# Patient Record
Sex: FEMALE | Race: OTHER | NOT HISPANIC OR LATINO
[De-identification: names, ages, dates, MRNs, and addresses within clinical notes are randomized per-mention and may not be internally consistent; named-entity substitution may affect disease eponyms.]

---

## 2017-08-18 ENCOUNTER — RESULT REVIEW (OUTPATIENT)
Age: 27
End: 2017-08-18

## 2018-05-18 ENCOUNTER — EMERGENCY (EMERGENCY)
Facility: HOSPITAL | Age: 28
LOS: 1 days | Discharge: HOME | End: 2018-05-18
Attending: EMERGENCY MEDICINE
Payer: SELF-PAY

## 2018-05-18 VITALS
SYSTOLIC BLOOD PRESSURE: 135 MMHG | HEART RATE: 60 BPM | DIASTOLIC BLOOD PRESSURE: 78 MMHG | WEIGHT: 175.05 LBS | RESPIRATION RATE: 19 BRPM | HEIGHT: 65 IN | OXYGEN SATURATION: 96 % | TEMPERATURE: 98 F

## 2018-05-18 PROCEDURE — 99283 EMERGENCY DEPT VISIT LOW MDM: CPT | Mod: 25

## 2018-05-18 PROCEDURE — 99053 MED SERV 10PM-8AM 24 HR FAC: CPT

## 2018-05-18 NOTE — ED ADULT NURSE NOTE - OBJECTIVE STATEMENT
Assumed pt care @ 4845. Pt received sitting on stretcher in NAD. Pt AOx3 C/O getting hot oil splashed from a hot pan onto left wrist/forearm. Pt reports its starting to get swollen/red and warm to touch and ithcing. Pt has put bacitracin. Pt had large blistered that peeled and now is scabbing. No pus noted and warm to touch. Scattered small healed burns throughout b/l arms.

## 2018-05-18 NOTE — ED ADULT NURSE NOTE - ADDITIONAL PRINTED INSTRUCTIONS GIVEN
pt d/c in stable condition, no apparent distress noted at this time. pt A&Ox3. pt able to ambulate with steady gait. Pt aware to pickup meds @ pharm

## 2018-05-19 VITALS
HEART RATE: 55 BPM | RESPIRATION RATE: 18 BRPM | SYSTOLIC BLOOD PRESSURE: 118 MMHG | DIASTOLIC BLOOD PRESSURE: 81 MMHG | OXYGEN SATURATION: 99 % | TEMPERATURE: 98 F

## 2018-05-19 PROCEDURE — 99283 EMERGENCY DEPT VISIT LOW MDM: CPT

## 2018-05-19 RX ADMIN — Medication 1 TABLET(S): at 03:09

## 2018-05-19 NOTE — ED PROVIDER NOTE - OBJECTIVE STATEMENT
28 y/o female presents to the ED with c/o burn to the left forearm, onset 1 week ago. Pt states she sustained burn from oil while at work. Pt reports itchiness to area, states she scratched region. Pt denies numbness, tingling, fever, and any other acute symptoms and complaints at this time. Tetanus UTD. 28 y/o female presents to the ED with c/o burn to the left forearm, onset 1 week ago. Pt states she sustained burn from oil while at work. Pt reports itchiness to area, states she scratched region. Pt states she has been picking at wound. Pt denies numbness, tingling, fever, and any other acute symptoms and complaints at this time. Tetanus UTD.

## 2018-05-19 NOTE — ED PROVIDER NOTE - PHYSICAL EXAMINATION
Constitutional : Appears comfortably, talking in full sentences  Head :NC AT , no swelling  Eyes :eomi, no swelling  Mouth :mm moist,  Neck : supple, trachea in midline  Chest :Rei air entry, symm chest expansion, no distress  Heart :S1 S2 distant  Abdomen :abd soft, non tender  Musc/Skel : ventral aspect 2x1in with granulating tissue, no puss pocket, minimal erythema extending proximal to wound on forearm, extending area is tender, warm, and mildly erythematous. ext no swelling, no deformity, no spine tenderness, distal pulses present  Neuro  :AAO 3 no focal deficits

## 2018-05-19 NOTE — ED PROVIDER NOTE - NS ED ROS FT
no weight change, no fever or chills  no rash, no bruises  no visual changes no eye discharge  no cough cold or congestion,   no sob, no chest pain  no orthopnea, no pnd  no abd pain, no n/v/d  no hematuria, no change in urinary habits  +burn to skin, no joint pain, no deformity  no headache, no paresthesia

## 2018-08-08 ENCOUNTER — EMERGENCY (EMERGENCY)
Facility: HOSPITAL | Age: 28
LOS: 1 days | Discharge: HOME | End: 2018-08-08
Attending: STUDENT IN AN ORGANIZED HEALTH CARE EDUCATION/TRAINING PROGRAM
Payer: COMMERCIAL

## 2018-08-08 VITALS
SYSTOLIC BLOOD PRESSURE: 123 MMHG | TEMPERATURE: 100 F | DIASTOLIC BLOOD PRESSURE: 80 MMHG | OXYGEN SATURATION: 98 % | RESPIRATION RATE: 17 BRPM | HEART RATE: 77 BPM

## 2018-08-08 VITALS — HEIGHT: 66 IN | WEIGHT: 169.09 LBS

## 2018-08-08 LAB — HCG UR QL: NEGATIVE — SIGNIFICANT CHANGE UP

## 2018-08-08 PROCEDURE — 99283 EMERGENCY DEPT VISIT LOW MDM: CPT

## 2018-08-08 PROCEDURE — 81025 URINE PREGNANCY TEST: CPT

## 2018-08-08 RX ORDER — IBUPROFEN 200 MG
600 TABLET ORAL ONCE
Qty: 0 | Refills: 0 | Status: COMPLETED | OUTPATIENT
Start: 2018-08-08 | End: 2018-08-08

## 2018-08-08 RX ORDER — ACETAMINOPHEN 500 MG
650 TABLET ORAL ONCE
Qty: 0 | Refills: 0 | Status: COMPLETED | OUTPATIENT
Start: 2018-08-08 | End: 2018-08-08

## 2018-08-08 RX ADMIN — Medication 650 MILLIGRAM(S): at 17:01

## 2018-08-08 RX ADMIN — Medication 600 MILLIGRAM(S): at 17:01

## 2018-08-08 NOTE — ED PROVIDER NOTE - ENMT, MLM
Airway patent, Nasal mucosa clear. Mouth with normal mucosa. Throat has no vesicles, no oropharyngeal exudates and uvula is midline. TM good light reflex b/l no evidence of infection

## 2018-08-08 NOTE — ED PROVIDER NOTE - MEDICAL DECISION MAKING DETAILS
28yo female with no pmh presents with left sided  ha for 1 day - likely cluster ha- will give O2 NRB for 20min and meds, r/o preg - ucg

## 2018-08-08 NOTE — ED PROVIDER NOTE - OBJECTIVE STATEMENT
26yo female with no pmh presents with left sided  ha for 1 day. Pt states a pressure like ha on the left side around her eye with some ear pressure, left. Pt denies fevers/chills, loc, focal neuro deficits, cp/sob/palp, cough, abd pain/n/v/d, urinary symptoms, recent travel and sick contacts.

## 2018-08-20 ENCOUNTER — RESULT REVIEW (OUTPATIENT)
Age: 28
End: 2018-08-20

## 2018-09-06 ENCOUNTER — OUTPATIENT (OUTPATIENT)
Dept: OUTPATIENT SERVICES | Facility: HOSPITAL | Age: 28
LOS: 1 days | End: 2018-09-06
Payer: COMMERCIAL

## 2018-09-06 VITALS
TEMPERATURE: 98 F | RESPIRATION RATE: 16 BRPM | WEIGHT: 176.37 LBS | SYSTOLIC BLOOD PRESSURE: 107 MMHG | DIASTOLIC BLOOD PRESSURE: 74 MMHG | HEART RATE: 60 BPM | HEIGHT: 66 IN

## 2018-09-06 DIAGNOSIS — Z01.818 ENCOUNTER FOR OTHER PREPROCEDURAL EXAMINATION: ICD-10-CM

## 2018-09-06 DIAGNOSIS — T83.9XXA UNSPECIFIED COMPLICATION OF GENITOURINARY PROSTHETIC DEVICE, IMPLANT AND GRAFT, INITIAL ENCOUNTER: ICD-10-CM

## 2018-09-06 LAB
BASOPHILS # BLD AUTO: 0 K/UL — SIGNIFICANT CHANGE UP (ref 0–0.2)
BASOPHILS NFR BLD AUTO: 0.3 % — SIGNIFICANT CHANGE UP (ref 0–2)
EOSINOPHIL # BLD AUTO: 0.2 K/UL — SIGNIFICANT CHANGE UP (ref 0–0.5)
EOSINOPHIL NFR BLD AUTO: 2.1 % — SIGNIFICANT CHANGE UP (ref 0–6)
HCG UR QL: NEGATIVE — SIGNIFICANT CHANGE UP
HCT VFR BLD CALC: 38.8 % — SIGNIFICANT CHANGE UP (ref 37–47)
HGB BLD-MCNC: 12.7 G/DL — SIGNIFICANT CHANGE UP (ref 12–16)
LYMPHOCYTES # BLD AUTO: 2.3 K/UL — SIGNIFICANT CHANGE UP (ref 1–4.8)
LYMPHOCYTES # BLD AUTO: 24 % — SIGNIFICANT CHANGE UP (ref 20–55)
MCHC RBC-ENTMCNC: 27.1 PG — SIGNIFICANT CHANGE UP (ref 27–31)
MCHC RBC-ENTMCNC: 32.7 G/DL — SIGNIFICANT CHANGE UP (ref 32–36)
MCV RBC AUTO: 82.9 FL — SIGNIFICANT CHANGE UP (ref 81–99)
MONOCYTES # BLD AUTO: 0.8 K/UL — SIGNIFICANT CHANGE UP (ref 0–0.8)
MONOCYTES NFR BLD AUTO: 8.1 % — SIGNIFICANT CHANGE UP (ref 3–10)
NEUTROPHILS # BLD AUTO: 6.1 K/UL — SIGNIFICANT CHANGE UP (ref 1.8–8)
NEUTROPHILS NFR BLD AUTO: 65 % — SIGNIFICANT CHANGE UP (ref 37–73)
PLATELET # BLD AUTO: 223 K/UL — SIGNIFICANT CHANGE UP (ref 150–400)
RBC # BLD: 4.68 M/UL — SIGNIFICANT CHANGE UP (ref 4.4–5.2)
RBC # FLD: 14.4 % — SIGNIFICANT CHANGE UP (ref 11–15.6)
WBC # BLD: 9.4 K/UL — SIGNIFICANT CHANGE UP (ref 4.8–10.8)
WBC # FLD AUTO: 9.4 K/UL — SIGNIFICANT CHANGE UP (ref 4.8–10.8)

## 2018-09-06 PROCEDURE — 36415 COLL VENOUS BLD VENIPUNCTURE: CPT

## 2018-09-06 PROCEDURE — G0463: CPT

## 2018-09-06 PROCEDURE — 81025 URINE PREGNANCY TEST: CPT

## 2018-09-06 PROCEDURE — 85027 COMPLETE CBC AUTOMATED: CPT

## 2018-09-06 RX ORDER — SODIUM CHLORIDE 9 MG/ML
3 INJECTION INTRAMUSCULAR; INTRAVENOUS; SUBCUTANEOUS ONCE
Qty: 0 | Refills: 0 | Status: DISCONTINUED | OUTPATIENT
Start: 2018-09-10 | End: 2018-09-25

## 2018-09-06 NOTE — H&P PST ADULT - NSANTHOSAYNRD_GEN_A_CORE
No. WINSTON screening performed.  STOP BANG Legend: 0-2 = LOW Risk; 3-4 = INTERMEDIATE Risk; 5-8 = HIGH Risk

## 2018-09-06 NOTE — H&P PST ADULT - FAMILY HISTORY
Father  Still living? Unknown  Family history of diabetes mellitus, Age at diagnosis: Age Unknown     Grandparent  Still living? Unknown  Family history of diabetes mellitus, Age at diagnosis: Age Unknown

## 2018-09-09 ENCOUNTER — TRANSCRIPTION ENCOUNTER (OUTPATIENT)
Age: 28
End: 2018-09-09

## 2018-09-10 ENCOUNTER — RESULT REVIEW (OUTPATIENT)
Age: 28
End: 2018-09-10

## 2018-09-10 ENCOUNTER — OUTPATIENT (OUTPATIENT)
Dept: OUTPATIENT SERVICES | Facility: HOSPITAL | Age: 28
LOS: 1 days | End: 2018-09-10
Payer: COMMERCIAL

## 2018-09-10 VITALS
HEART RATE: 75 BPM | TEMPERATURE: 98 F | OXYGEN SATURATION: 100 % | WEIGHT: 177.91 LBS | SYSTOLIC BLOOD PRESSURE: 117 MMHG | HEIGHT: 66 IN | DIASTOLIC BLOOD PRESSURE: 54 MMHG | RESPIRATION RATE: 16 BRPM

## 2018-09-10 VITALS
RESPIRATION RATE: 16 BRPM | OXYGEN SATURATION: 100 % | SYSTOLIC BLOOD PRESSURE: 115 MMHG | HEART RATE: 56 BPM | DIASTOLIC BLOOD PRESSURE: 69 MMHG

## 2018-09-10 DIAGNOSIS — T83.32XA DISPLACEMENT OF INTRAUTERINE CONTRACEPTIVE DEVICE, INITIAL ENCOUNTER: ICD-10-CM

## 2018-09-10 PROCEDURE — 88300 SURGICAL PATH GROSS: CPT

## 2018-09-10 PROCEDURE — 88305 TISSUE EXAM BY PATHOLOGIST: CPT

## 2018-09-10 PROCEDURE — 88300 SURGICAL PATH GROSS: CPT | Mod: 26,59

## 2018-09-10 PROCEDURE — 58562 HYSTEROSCOPY REMOVE FB: CPT

## 2018-09-10 PROCEDURE — 88305 TISSUE EXAM BY PATHOLOGIST: CPT | Mod: 26

## 2018-09-10 RX ORDER — ONDANSETRON 8 MG/1
4 TABLET, FILM COATED ORAL ONCE
Qty: 0 | Refills: 0 | Status: DISCONTINUED | OUTPATIENT
Start: 2018-09-10 | End: 2018-09-10

## 2018-09-10 RX ORDER — FENTANYL CITRATE 50 UG/ML
25 INJECTION INTRAVENOUS
Qty: 0 | Refills: 0 | Status: DISCONTINUED | OUTPATIENT
Start: 2018-09-10 | End: 2018-09-10

## 2018-09-10 RX ORDER — SODIUM CHLORIDE 9 MG/ML
1000 INJECTION, SOLUTION INTRAVENOUS
Qty: 0 | Refills: 0 | Status: DISCONTINUED | OUTPATIENT
Start: 2018-09-10 | End: 2018-09-25

## 2018-09-10 RX ORDER — SODIUM CHLORIDE 9 MG/ML
1000 INJECTION, SOLUTION INTRAVENOUS
Qty: 0 | Refills: 0 | Status: DISCONTINUED | OUTPATIENT
Start: 2018-09-10 | End: 2018-09-10

## 2018-09-10 NOTE — BRIEF OPERATIVE NOTE - PRE-OP DX
Abnormal uterine bleeding  09/10/2018    Active  Mary Hannah  IUD (intrauterine device) in place  09/10/2018  ; embedded  Active  Mary Hannah

## 2018-09-10 NOTE — BRIEF OPERATIVE NOTE - PROCEDURE
<<-----Click on this checkbox to enter Procedure IUD (removal of intrauterine device)  09/10/2018    Active  BELINDA  Hysteroscopy with dilation and curettage of uterus  09/10/2018    Active  BELINDA

## 2018-09-12 LAB — SURGICAL PATHOLOGY FINAL REPORT - CH: SIGNIFICANT CHANGE UP

## 2019-07-26 ENCOUNTER — RECORD ABSTRACTING (OUTPATIENT)
Age: 29
End: 2019-07-26

## 2019-07-26 DIAGNOSIS — Z80.49 FAMILY HISTORY OF MALIGNANT NEOPLASM OF OTHER GENITAL ORGANS: ICD-10-CM

## 2019-07-26 DIAGNOSIS — Z82.49 FAMILY HISTORY OF ISCHEMIC HEART DISEASE AND OTHER DISEASES OF THE CIRCULATORY SYSTEM: ICD-10-CM

## 2019-07-26 DIAGNOSIS — N76.0 ACUTE VAGINITIS: ICD-10-CM

## 2019-07-26 DIAGNOSIS — Z78.9 OTHER SPECIFIED HEALTH STATUS: ICD-10-CM

## 2019-07-26 DIAGNOSIS — Z87.42 PERSONAL HISTORY OF OTHER DISEASES OF THE FEMALE GENITAL TRACT: ICD-10-CM

## 2019-07-26 DIAGNOSIS — Z92.89 PERSONAL HISTORY OF OTHER MEDICAL TREATMENT: ICD-10-CM

## 2019-07-26 DIAGNOSIS — Z83.3 FAMILY HISTORY OF DIABETES MELLITUS: ICD-10-CM

## 2019-07-26 DIAGNOSIS — B96.89 ACUTE VAGINITIS: ICD-10-CM

## 2019-07-26 DIAGNOSIS — Z82.62 FAMILY HISTORY OF OSTEOPOROSIS: ICD-10-CM

## 2019-07-26 DIAGNOSIS — R87.610 ATYPICAL SQUAMOUS CELLS OF UNDETERMINED SIGNIFICANCE ON CYTOLOGIC SMEAR OF CERVIX (ASC-US): ICD-10-CM

## 2019-07-26 DIAGNOSIS — Z86.19 PERSONAL HISTORY OF OTHER INFECTIOUS AND PARASITIC DISEASES: ICD-10-CM

## 2019-07-26 LAB — CYTOLOGY CVX/VAG DOC THIN PREP: NORMAL

## 2019-07-27 ENCOUNTER — APPOINTMENT (OUTPATIENT)
Dept: OBGYN | Facility: CLINIC | Age: 29
End: 2019-07-27
Payer: COMMERCIAL

## 2019-07-27 VITALS
DIASTOLIC BLOOD PRESSURE: 70 MMHG | SYSTOLIC BLOOD PRESSURE: 122 MMHG | BODY MASS INDEX: 29.09 KG/M2 | HEIGHT: 66 IN | WEIGHT: 181 LBS

## 2019-07-27 LAB
BILIRUB UR QL STRIP: NORMAL
GLUCOSE UR-MCNC: NORMAL
HCG UR QL: 0.2 EU/DL
HCG UR QL: NEGATIVE
HGB UR QL STRIP.AUTO: NORMAL
KETONES UR-MCNC: ABNORMAL
LEUKOCYTE ESTERASE UR QL STRIP: NORMAL
NITRITE UR QL STRIP: NORMAL
PH UR STRIP: 7
PROT UR STRIP-MCNC: NORMAL
QUALITY CONTROL: YES
SP GR UR STRIP: 1.02

## 2019-07-27 PROCEDURE — 99213 OFFICE O/P EST LOW 20 MIN: CPT

## 2019-07-27 PROCEDURE — 81025 URINE PREGNANCY TEST: CPT

## 2019-07-27 PROCEDURE — 81003 URINALYSIS AUTO W/O SCOPE: CPT | Mod: QW

## 2019-07-29 NOTE — PHYSICAL EXAM
[Normal] : uterus [Moderate] : moderate [Discharge] : a  ~M vaginal discharge was present [No Bleeding] : there was no active vaginal bleeding [Cheesy] : cheesy [Uterine Adnexae] : were not tender and not enlarged

## 2019-07-29 NOTE — HISTORY OF PRESENT ILLNESS
[1 Year Ago] : 1 year ago [Regular Exercise] : regular exercise [Healthy Diet] : a healthy diet [Good] : being in good health [Reproductive Age] : is of reproductive age [Last Pap ___] : Last cervical pap smear was [unfilled] [Last Screen ___] : last STD screen was [unfilled] [Definite:  ___ (Date)] : the last menstrual period was [unfilled] [Menarche Age: ____] : age at menarche was [unfilled] [Condoms] : uses condoms [Sexually Active] : is sexually active [Contraception] : uses contraception [NA] : N/A [Male ___] : [unfilled] male [Weight Concerns] : no concerns with her weight [Localized Pain] : no localized pain [de-identified] : Pelvic US 8/23/2018 [Mass (___cm)] : no palpable mass [Nipple Discharge] : no nipple discharge [Diffused Pain] : no diffused pain [Skin Color Change] : no skin color change

## 2019-07-29 NOTE — COUNSELING
[Nutrition] : nutrition [Exercise] : exercise [STD (testing, results, tx)] : STD (testing, results, tx) [Vitamins/Supplements] : vitamins/supplements [Vulvar Hygiene] : vulvar hygiene [Bladder Hygiene] : bladder hygiene

## 2019-08-05 LAB
C TRACH RRNA SPEC QL NAA+PROBE: NOT DETECTED
CANDIDA VAG CYTO: NOT DETECTED
G VAGINALIS+PREV SP MTYP VAG QL MICRO: NOT DETECTED
N GONORRHOEA RRNA SPEC QL NAA+PROBE: NOT DETECTED
SOURCE AMPLIFICATION: NORMAL
T VAGINALIS VAG QL WET PREP: NOT DETECTED

## 2019-08-30 ENCOUNTER — APPOINTMENT (OUTPATIENT)
Dept: OBGYN | Facility: CLINIC | Age: 29
End: 2019-08-30
Payer: COMMERCIAL

## 2019-08-30 ENCOUNTER — RESULT CHARGE (OUTPATIENT)
Age: 29
End: 2019-08-30

## 2019-08-30 ENCOUNTER — LABORATORY RESULT (OUTPATIENT)
Age: 29
End: 2019-08-30

## 2019-08-30 VITALS
SYSTOLIC BLOOD PRESSURE: 110 MMHG | HEIGHT: 66 IN | BODY MASS INDEX: 31.5 KG/M2 | WEIGHT: 196 LBS | DIASTOLIC BLOOD PRESSURE: 70 MMHG

## 2019-08-30 DIAGNOSIS — N76.0 ACUTE VAGINITIS: ICD-10-CM

## 2019-08-30 DIAGNOSIS — Z87.42 PERSONAL HISTORY OF OTHER DISEASES OF THE FEMALE GENITAL TRACT: ICD-10-CM

## 2019-08-30 DIAGNOSIS — Z78.9 OTHER SPECIFIED HEALTH STATUS: ICD-10-CM

## 2019-08-30 LAB
BILIRUB UR QL STRIP: NORMAL
CLARITY UR: CLEAR
COLLECTION METHOD: NORMAL
GLUCOSE UR-MCNC: NORMAL
HCG UR QL: 0.2 EU/DL
HCG UR QL: NEGATIVE
HGB UR QL STRIP.AUTO: NORMAL
KETONES UR-MCNC: NORMAL
LEUKOCYTE ESTERASE UR QL STRIP: NORMAL
NITRITE UR QL STRIP: NORMAL
PH UR STRIP: 5
PROT UR STRIP-MCNC: NORMAL
QUALITY CONTROL: YES
SP GR UR STRIP: 1.03

## 2019-08-30 PROCEDURE — 99395 PREV VISIT EST AGE 18-39: CPT

## 2019-08-30 PROCEDURE — 81025 URINE PREGNANCY TEST: CPT

## 2019-08-30 PROCEDURE — 81003 URINALYSIS AUTO W/O SCOPE: CPT | Mod: NC,QW

## 2019-08-30 RX ORDER — METRONIDAZOLE 7.5 MG/G
0.75 GEL VAGINAL
Qty: 1 | Refills: 0 | Status: DISCONTINUED | COMMUNITY
Start: 2019-07-27 | End: 2019-08-30

## 2019-08-30 RX ORDER — FLUCONAZOLE 150 MG/1
150 TABLET ORAL
Qty: 2 | Refills: 1 | Status: DISCONTINUED | COMMUNITY
Start: 2019-07-27 | End: 2019-08-30

## 2019-08-30 RX ORDER — NYSTATIN AND TRIAMCINOLONE ACETONIDE 100000; 1 MG/G; MG/G
100000-0.1 CREAM TOPICAL TWICE DAILY
Qty: 1 | Refills: 0 | Status: DISCONTINUED | COMMUNITY
Start: 2019-07-27 | End: 2019-08-30

## 2019-08-30 RX ORDER — MONTELUKAST 10 MG/1
10 TABLET, FILM COATED ORAL
Qty: 30 | Refills: 0 | Status: DISCONTINUED | COMMUNITY
Start: 2019-07-15 | End: 2019-08-30

## 2019-08-30 NOTE — PHYSICAL EXAM
[Awake] : awake [Alert] : alert [Soft] : soft [Labia Majora] : labia major [Labia Minora] : labia minora [Uterine Adnexae] : were not tender and not enlarged [Normal] : clitoris [Acute Distress] : no acute distress [Thyroid Nodule] : no thyroid nodule [LAD] : no lymphadenopathy [Goiter] : no goiter [Mass] : no breast mass [Nipple Discharge] : no nipple discharge [Axillary LAD] : no axillary lymphadenopathy [Tender] : non tender

## 2019-08-30 NOTE — HISTORY OF PRESENT ILLNESS
[Definite:  ___ (Date)] : the last menstrual period was [unfilled] [Male ___] : [unfilled] male [Sexually Active] : is sexually active

## 2019-09-02 LAB
BACTERIA UR CULT: NORMAL
C TRACH RRNA SPEC QL NAA+PROBE: NOT DETECTED
N GONORRHOEA RRNA SPEC QL NAA+PROBE: NOT DETECTED
SOURCE AMPLIFICATION: NORMAL
SOURCE TP AMPLIFICATION: NORMAL
T VAGINALIS RRNA SPEC QL NAA+PROBE: NOT DETECTED

## 2019-09-05 ENCOUNTER — RESULT REVIEW (OUTPATIENT)
Age: 29
End: 2019-09-05

## 2019-09-06 LAB — CYTOLOGY CVX/VAG DOC THIN PREP: NORMAL

## 2020-02-05 ENCOUNTER — LABORATORY RESULT (OUTPATIENT)
Age: 30
End: 2020-02-05

## 2020-02-05 ENCOUNTER — APPOINTMENT (OUTPATIENT)
Dept: OBGYN | Facility: CLINIC | Age: 30
End: 2020-02-05
Payer: COMMERCIAL

## 2020-02-05 VITALS
DIASTOLIC BLOOD PRESSURE: 70 MMHG | WEIGHT: 195 LBS | HEIGHT: 66 IN | SYSTOLIC BLOOD PRESSURE: 128 MMHG | BODY MASS INDEX: 31.34 KG/M2

## 2020-02-05 DIAGNOSIS — B96.89 ACUTE VAGINITIS: ICD-10-CM

## 2020-02-05 DIAGNOSIS — Z11.3 ENCOUNTER FOR SCREENING FOR INFECTIONS WITH A PREDOMINANTLY SEXUAL MODE OF TRANSMISSION: ICD-10-CM

## 2020-02-05 DIAGNOSIS — N76.0 ACUTE VAGINITIS: ICD-10-CM

## 2020-02-05 LAB
BILIRUB UR QL STRIP: NORMAL
GLUCOSE UR-MCNC: NORMAL
HCG UR QL: 0.2 EU/DL
HCG UR QL: NEGATIVE
HGB UR QL STRIP.AUTO: NORMAL
KETONES UR-MCNC: NORMAL
LEUKOCYTE ESTERASE UR QL STRIP: NORMAL
NITRITE UR QL STRIP: NORMAL
PH UR STRIP: 5.5
PROT UR STRIP-MCNC: NORMAL
QUALITY CONTROL: YES
SP GR UR STRIP: 1.03

## 2020-02-05 PROCEDURE — 81003 URINALYSIS AUTO W/O SCOPE: CPT | Mod: QW

## 2020-02-05 PROCEDURE — 36415 COLL VENOUS BLD VENIPUNCTURE: CPT

## 2020-02-05 PROCEDURE — 81025 URINE PREGNANCY TEST: CPT

## 2020-02-05 PROCEDURE — 99213 OFFICE O/P EST LOW 20 MIN: CPT

## 2020-02-06 LAB
C TRACH RRNA SPEC QL NAA+PROBE: NOT DETECTED
HAV IGM SER QL: NONREACTIVE
HBV CORE IGM SER QL: NONREACTIVE
HBV SURFACE AG SER QL: NONREACTIVE
HCV AB SER QL: NONREACTIVE
HCV S/CO RATIO: 0.29 S/CO
HIV1+2 AB SPEC QL IA.RAPID: NONREACTIVE
HSV 1+2 IGG SER IA-IMP: NEGATIVE
HSV 1+2 IGG SER IA-IMP: NEGATIVE
HSV1 IGG SER QL: <0.01 INDEX
HSV2 IGG SER QL: 0.61 INDEX
N GONORRHOEA RRNA SPEC QL NAA+PROBE: NOT DETECTED
SOURCE AMPLIFICATION: NORMAL

## 2020-02-08 LAB
CANDIDA VAG CYTO: DETECTED
G VAGINALIS+PREV SP MTYP VAG QL MICRO: DETECTED
T VAGINALIS VAG QL WET PREP: NOT DETECTED

## 2020-02-08 NOTE — COUNSELING
[HIV Postest] : HIV postest [HIV Pretest] : HIV pretest [STD (testing, results, tx)] : STD (testing, results, tx) [Vulvar Hygiene] : vulvar hygiene [Safe Sexual Practices] : safe sexual practices [Contraception] : contraception

## 2020-02-08 NOTE — PHYSICAL EXAM
[Discharge] : a  ~M vaginal discharge was present [Normal] : uterus [Foul Smelling] : foul smelling [White] : white [No Bleeding] : there was no active vaginal bleeding [Uterine Adnexae] : were not tender and not enlarged [Thin] : thin

## 2020-02-08 NOTE — HISTORY OF PRESENT ILLNESS
[Last Pap ___] : Last cervical pap smear was [unfilled] [Reproductive Age] : is of reproductive age [Last Screen ___] : last STD screen was [unfilled] [Menarche Age: ____] : age at menarche was [unfilled] [Definite:  ___ (Date)] : the last menstrual period was [unfilled] [Male ___] : [unfilled] male [Monogamous] : is monogamous [6 Months Ago] : 6 months ago [Good] : being in good health [Healthy Diet] : a healthy diet [No Previous Mammogram] : no previous mammogram [No Previous Colonoscopy] : no previous colonoscopy [Contraception] : uses contraception [Condoms] : uses condoms [Pregnancy History] : pregnancy history: [Total Preg ___] : [unfilled] [Full Term ___] : [unfilled] [Living ___] : [unfilled] [Sexually Active] : is sexually active [de-identified] : s [Weight Concerns] : no concerns with her weight [Regular Exercise] : not exercising regularly

## 2020-02-11 DIAGNOSIS — Z86.19 PERSONAL HISTORY OF OTHER INFECTIOUS AND PARASITIC DISEASES: ICD-10-CM

## 2020-02-18 LAB — T PALLIDUM AB SER QL IA: POSITIVE

## 2020-08-18 NOTE — H&P PST ADULT - PROBLEM SELECTOR PLAN 1
- will review case at clinical trials meeting tomorrow.  - Plan is for EBUS next Tuesday. I will call patient's son and Dr. Lutz with what we discuss at the meeting and plan pending biopsy.
hysteroscopic removal of embedded IUD and related procedures

## 2020-08-26 NOTE — ED ADULT NURSE NOTE - SKIN TURGOR
Faxed    dw
Home health certification & plan of care form placed in your folder 
Plan of Care form from [facility] needs to be reviewed and signed by Dr Marie Schneider  Placed form in nurses folder  Once completed, please fax form to [phone] 
Please scan and fax form as requested  Sami RICCI 
resilient/elastic

## 2020-09-17 ENCOUNTER — APPOINTMENT (OUTPATIENT)
Dept: OBGYN | Facility: CLINIC | Age: 30
End: 2020-09-17
Payer: COMMERCIAL

## 2020-09-17 ENCOUNTER — LABORATORY RESULT (OUTPATIENT)
Age: 30
End: 2020-09-17

## 2020-09-17 VITALS
WEIGHT: 211.13 LBS | BODY MASS INDEX: 33.93 KG/M2 | SYSTOLIC BLOOD PRESSURE: 118 MMHG | HEIGHT: 66 IN | DIASTOLIC BLOOD PRESSURE: 78 MMHG

## 2020-09-17 DIAGNOSIS — Z01.419 ENCOUNTER FOR GYNECOLOGICAL EXAMINATION (GENERAL) (ROUTINE) W/OUT ABNORMAL FINDINGS: ICD-10-CM

## 2020-09-17 LAB
BILIRUB UR QL STRIP: NORMAL
GLUCOSE UR-MCNC: NORMAL
HCG UR QL: 0.2 EU/DL
HCG UR QL: NEGATIVE
HGB UR QL STRIP.AUTO: NORMAL
KETONES UR-MCNC: NORMAL
LEUKOCYTE ESTERASE UR QL STRIP: NORMAL
NITRITE UR QL STRIP: NORMAL
PH UR STRIP: 5.5
PROT UR STRIP-MCNC: ABNORMAL
QUALITY CONTROL: YES
SP GR UR STRIP: >1.03

## 2020-09-17 PROCEDURE — 99213 OFFICE O/P EST LOW 20 MIN: CPT | Mod: 25

## 2020-09-17 PROCEDURE — 99395 PREV VISIT EST AGE 18-39: CPT

## 2020-09-17 PROCEDURE — 81025 URINE PREGNANCY TEST: CPT

## 2020-09-17 PROCEDURE — 36415 COLL VENOUS BLD VENIPUNCTURE: CPT

## 2020-09-17 PROCEDURE — 81003 URINALYSIS AUTO W/O SCOPE: CPT | Mod: QW

## 2020-09-17 RX ORDER — FLUCONAZOLE 150 MG/1
150 TABLET ORAL
Qty: 2 | Refills: 0 | Status: DISCONTINUED | COMMUNITY
Start: 2020-02-11 | End: 2020-09-17

## 2020-09-17 RX ORDER — METRONIDAZOLE 7.5 MG/G
0.75 GEL VAGINAL
Qty: 1 | Refills: 1 | Status: DISCONTINUED | COMMUNITY
Start: 2020-02-05 | End: 2020-09-17

## 2020-09-17 NOTE — END OF VISIT
[FreeTextEntry3] : I, Rayna Sexton, solely acted as scribe for Dr. Mary Hannah on 09/17/2020.\par All medical entries made by the Scribe were at my, Dr. Hannah's, direction and personally dictated by me on 09/17/2020. I have reviewed the chart and agree that the record accurately reflects my personal performance of the history, physical exam, assessment and plan. I have also personally directed, reviewed, and agreed with the chart.

## 2020-09-17 NOTE — DISCUSSION/SUMMARY
[FreeTextEntry1] : The benefits of adequate calcium intake and a daily multivitamin along with routine daily cardiovascular exercise were reviewed with the patient. \par \par Self-breast exam reviewed.\par \par STD testing requested.\par \par We reviewed birth control alternatives along with the attendant risks, benefits and complications of the oral contraceptive, contraceptive ring, injectable contraceptive, intrauterine devices and permanent forms of sterilization. Patient desires oral contraceptives. Rx Loestrin sent to pharmacy. Instructions given.\par \par She will follow up annually and as needed.

## 2020-09-17 NOTE — HISTORY OF PRESENT ILLNESS
[Patient reported PAP Smear was abnormal] : Patient reported PAP Smear was abnormal [HIV Test offered] : HIV Test offered [Gonorrhea test offered] : Gonorrhea test offered [Chlamydia test offered] : Chlamydia test offered [Hepatitis B test offered] : Hepatitis B test offered [Hepatitis C test offered] : Hepatitis C test offered [Y] : Positive pregnancy history [Menarche Age: ____] : age at menarche was [unfilled] [Currently Active] : currently active [Men] : men [No] : No [Patient would like to be screened for STIs] : Patient would like to be screened for STIs [Yes] : pregnancy [TextBox_4] : PATIENT PRESENTS FOR ANNUAL [PapSmeardate] : 08/30/2019 [TextBox_31] : WNL [HIVDate] : 02/05/2020 [TextBox_53] : NEG [GonorrheaDate] : 02/05/2020 [TextBox_63] : NEG [ChlamydiaDate] : 02/05/2020 [TextBox_68] : NEG [HepatitisBDate] : 02/05/2020 [TextBox_83] : NONREACTIVE [HepatitisCDate] : 02/05/2020 [TextBox_88] : NON REACTIVE [LMPDate] : 09/13/2020 [PGxTotal] : 1 [[facility]] : 1 [[facility]] : 1 [FreeTextEntry1] : 09/13/2020 [FreeTextEntry4] : PATIENT REFUSES TESTING FOR HEPATITIS

## 2020-09-18 LAB
C TRACH RRNA SPEC QL NAA+PROBE: NOT DETECTED
HIV1+2 AB SPEC QL IA.RAPID: NONREACTIVE
HPV HIGH+LOW RISK DNA PNL CVX: NOT DETECTED
HSV 1+2 IGG SER IA-IMP: NEGATIVE
HSV 1+2 IGG SER IA-IMP: NEGATIVE
HSV1 IGG SER QL: 0.18 INDEX
HSV2 IGG SER QL: 0.52 INDEX
N GONORRHOEA RRNA SPEC QL NAA+PROBE: NOT DETECTED
SOURCE AMPLIFICATION: NORMAL

## 2020-09-20 LAB
HAV IGM SER QL: NONREACTIVE
HBV CORE IGM SER QL: NONREACTIVE
HBV SURFACE AG SER QL: NONREACTIVE
HCV AB SER QL: NONREACTIVE
HCV S/CO RATIO: 0.19 S/CO

## 2020-09-22 LAB — T PALLIDUM AB SER QL IA: POSITIVE

## 2020-09-24 LAB — CYTOLOGY CVX/VAG DOC THIN PREP: NORMAL

## 2020-12-23 PROBLEM — Z86.19 HISTORY OF CANDIDIASIS OF VAGINA: Status: RESOLVED | Noted: 2020-02-11 | Resolved: 2020-12-23

## 2020-12-23 PROBLEM — N76.0 BV (BACTERIAL VAGINOSIS): Status: RESOLVED | Noted: 2020-02-05 | Resolved: 2020-12-23

## 2020-12-23 PROBLEM — Z01.419 ENCOUNTER FOR ANNUAL ROUTINE GYNECOLOGICAL EXAMINATION: Status: RESOLVED | Noted: 2020-09-17 | Resolved: 2020-12-23

## 2021-04-10 ENCOUNTER — RESULT CHARGE (OUTPATIENT)
Age: 31
End: 2021-04-10

## 2021-04-10 ENCOUNTER — APPOINTMENT (OUTPATIENT)
Dept: OBGYN | Facility: CLINIC | Age: 31
End: 2021-04-10
Payer: COMMERCIAL

## 2021-04-10 VITALS
HEIGHT: 66 IN | BODY MASS INDEX: 31.98 KG/M2 | TEMPERATURE: 97.8 F | WEIGHT: 199 LBS | SYSTOLIC BLOOD PRESSURE: 120 MMHG | DIASTOLIC BLOOD PRESSURE: 80 MMHG

## 2021-04-10 LAB
BILIRUB UR QL STRIP: NORMAL
GLUCOSE UR-MCNC: NORMAL
HCG UR QL: 0.2 EU/DL
HGB UR QL STRIP.AUTO: NORMAL
KETONES UR-MCNC: NORMAL
LEUKOCYTE ESTERASE UR QL STRIP: NORMAL
NITRITE UR QL STRIP: NORMAL
PH UR STRIP: 7.5
PROT UR STRIP-MCNC: NORMAL
SP GR UR STRIP: 1.02

## 2021-04-10 PROCEDURE — 81003 URINALYSIS AUTO W/O SCOPE: CPT | Mod: QW

## 2021-04-10 PROCEDURE — 99072 ADDL SUPL MATRL&STAF TM PHE: CPT

## 2021-04-10 PROCEDURE — 99212 OFFICE O/P EST SF 10 MIN: CPT | Mod: 25

## 2021-04-10 PROCEDURE — 57150 TREAT VAGINA INFECTION: CPT

## 2021-04-10 RX ORDER — NORETHINDRONE ACETATE AND ETHINYL ESTRADIOL AND FERROUS FUMARATE 1MG-20(21)
1-20 KIT ORAL
Qty: 1 | Refills: 6 | Status: ACTIVE | COMMUNITY
Start: 2021-04-10 | End: 1900-01-01

## 2021-04-13 LAB
CANDIDA VAG CYTO: DETECTED
G VAGINALIS+PREV SP MTYP VAG QL MICRO: NOT DETECTED
T VAGINALIS VAG QL WET PREP: NOT DETECTED

## 2021-05-31 NOTE — PHYSICAL EXAM
[Appropriately responsive] : appropriately responsive [No Acute Distress] : no acute distress [Alert] : alert [Oriented x3] : oriented x3 [Labia Minora] : normal [Labia Majora] : normal [Normal] : normal [Normal Position] : in a normal position [Uterine Adnexae] : normal [FreeTextEntry4] : small amount whitish discharge, "curd" like discharge - vault irrigated.

## 2021-05-31 NOTE — HISTORY OF PRESENT ILLNESS
[Patient reported PAP Smear was normal] : Patient reported PAP Smear was normal [Syphilis test offered] : Syphilis test offered [Gonorrhea test offered] : Gonorrhea test offered [Chlamydia test offered] : Chlamydia test offered [HPV test offered] : HPV test offered [Hepatitis B test offered] : Hepatitis B test offered [Hepatitis C test offered] : Hepatitis C test offered [Menarche Age: ____] : age at menarche was [unfilled] [N] : Patient does not use contraception [Y] : Patient is sexually active [Monogamous (Male Partner)] : is monogamous with a male partner [Currently Active] : currently active [Men] : men [Vaginal] : vaginal [No] : No [TextBox_4] : PATIENT PRESENTS FOR IRRITATION OUTSIDE OF THE VAGINA. [PapSmeardate] : 09/17/20 [SyphilisDate] : 09/18/20 [TextBox_31] : NEG [TextBox_58] : POSITVE [GonorrheaDate] : 09/17/20 [TextBox_63] : NEG [ChlamydiaDate] : 09/17/20 [TextBox_68] : NEG [HPVDate] : 09/17/20 [TextBox_78] : NEG [HepatitisBDate] : 09/18/20 [TextBox_83] : NEG [HepatitisCDate] : 09/18/20 [TextBox_88] : NEG [PGxTotal] : 1 [LMPDate] : 04/01/21 [[facility]] : 1 [[facility] Center[facility]] : 1 [FreeTextEntry1] : 04/01/21

## 2021-05-31 NOTE — DISCUSSION/SUMMARY
[FreeTextEntry1] : Pt aware pending culture results any further tx.  All the pt's questions and concerns were addressed.

## 2021-05-31 NOTE — COUNSELING
[Contraception/ Emergency Contraception/ Safe Sexual Practices] : contraception, emergency contraception, safe sexual practices [FreeTextEntry2] : Genital hygiene, including OTC products, shaving.

## 2021-06-15 ENCOUNTER — APPOINTMENT (OUTPATIENT)
Dept: OBGYN | Facility: CLINIC | Age: 31
End: 2021-06-15

## 2021-06-21 ENCOUNTER — RESULT CHARGE (OUTPATIENT)
Age: 31
End: 2021-06-21

## 2021-06-21 ENCOUNTER — APPOINTMENT (OUTPATIENT)
Dept: OBGYN | Facility: CLINIC | Age: 31
End: 2021-06-21
Payer: COMMERCIAL

## 2021-06-21 ENCOUNTER — LABORATORY RESULT (OUTPATIENT)
Age: 31
End: 2021-06-21

## 2021-06-21 VITALS
SYSTOLIC BLOOD PRESSURE: 120 MMHG | HEIGHT: 66 IN | WEIGHT: 189 LBS | BODY MASS INDEX: 30.37 KG/M2 | DIASTOLIC BLOOD PRESSURE: 80 MMHG | TEMPERATURE: 97.7 F

## 2021-06-21 DIAGNOSIS — Z30.9 ENCOUNTER FOR CONTRACEPTIVE MANAGEMENT, UNSPECIFIED: ICD-10-CM

## 2021-06-21 DIAGNOSIS — Z11.3 ENCOUNTER FOR SCREENING FOR INFECTIONS WITH A PREDOMINANTLY SEXUAL MODE OF TRANSMISSION: ICD-10-CM

## 2021-06-21 LAB
BILIRUB UR QL STRIP: NORMAL
GLUCOSE UR-MCNC: NORMAL
HCG UR QL: 0.2 EU/DL
HCG UR QL: NEGATIVE
HGB UR QL STRIP.AUTO: NORMAL
KETONES UR-MCNC: NORMAL
LEUKOCYTE ESTERASE UR QL STRIP: NORMAL
NITRITE UR QL STRIP: NORMAL
PH UR STRIP: 7
PROT UR STRIP-MCNC: NORMAL
QUALITY CONTROL: YES
SP GR UR STRIP: 1.02

## 2021-06-21 PROCEDURE — 99072 ADDL SUPL MATRL&STAF TM PHE: CPT

## 2021-06-21 PROCEDURE — 99213 OFFICE O/P EST LOW 20 MIN: CPT

## 2021-06-21 PROCEDURE — 81003 URINALYSIS AUTO W/O SCOPE: CPT | Mod: QW

## 2021-06-21 PROCEDURE — 81025 URINE PREGNANCY TEST: CPT

## 2021-06-21 PROCEDURE — 36415 COLL VENOUS BLD VENIPUNCTURE: CPT

## 2021-06-21 RX ORDER — FLUCONAZOLE 150 MG/1
150 TABLET ORAL
Qty: 2 | Refills: 2 | Status: ACTIVE | COMMUNITY
Start: 2021-04-13 | End: 1900-01-01

## 2021-06-28 ENCOUNTER — APPOINTMENT (OUTPATIENT)
Dept: OBGYN | Facility: CLINIC | Age: 31
End: 2021-06-28
Payer: COMMERCIAL

## 2021-06-28 ENCOUNTER — ASOB RESULT (OUTPATIENT)
Age: 31
End: 2021-06-28

## 2021-06-28 VITALS
SYSTOLIC BLOOD PRESSURE: 118 MMHG | BODY MASS INDEX: 30.22 KG/M2 | DIASTOLIC BLOOD PRESSURE: 80 MMHG | TEMPERATURE: 97.9 F | HEIGHT: 66 IN | WEIGHT: 188 LBS

## 2021-06-28 PROCEDURE — 99072 ADDL SUPL MATRL&STAF TM PHE: CPT

## 2021-06-28 PROCEDURE — 36415 COLL VENOUS BLD VENIPUNCTURE: CPT

## 2021-06-28 PROCEDURE — 76830 TRANSVAGINAL US NON-OB: CPT

## 2021-06-28 PROCEDURE — 99213 OFFICE O/P EST LOW 20 MIN: CPT | Mod: 25

## 2021-06-28 RX ORDER — METRONIDAZOLE 7.5 MG/G
0.75 GEL VAGINAL
Qty: 1 | Refills: 1 | Status: ACTIVE | COMMUNITY
Start: 2021-06-21 | End: 1900-01-01

## 2021-06-28 NOTE — HISTORY OF PRESENT ILLNESS
[FreeTextEntry1] : Pt presents for test results\par \par We reviewed slightly elevated prolactin- repeat sent\par \par Pt had a normal menses this past month- she bleed irregularly the month prior\par \par Pelvic sonogram and labs reviewed\par \par She is starting the pill this month has- rx\par \par She has a hx of recurrent BV- we reviewed boric acid protocol- she had success with this din the past\par \par

## 2021-06-28 NOTE — PLAN
[FreeTextEntry1] : Recurrent BV- reviewed- -vulvovaginal hygiene discussed\par repeat lab sent\par All questions answered\par \par

## 2021-07-03 LAB
ANDROST SERPL-MCNC: 72 NG/DL
C TRACH RRNA SPEC QL NAA+PROBE: NOT DETECTED
CANDIDA VAG CYTO: NOT DETECTED
DHEA-S SERPL-MCNC: 159 UG/DL
ESTRADIOL SERPL-MCNC: 142 PG/ML
FSH SERPL-MCNC: 3.4 IU/L
G VAGINALIS+PREV SP MTYP VAG QL MICRO: NOT DETECTED
HAV IGM SER QL: NONREACTIVE
HBV CORE IGM SER QL: NONREACTIVE
HBV SURFACE AG SER QL: NONREACTIVE
HCG SERPL-MCNC: <1 MIU/ML
HCV AB SER QL: NONREACTIVE
HCV S/CO RATIO: 0.19 S/CO
HIV1+2 AB SPEC QL IA.RAPID: NONREACTIVE
HSV 1+2 IGG SER IA-IMP: NEGATIVE
HSV 1+2 IGG SER IA-IMP: NEGATIVE
HSV1 IGG SER QL: 0.15 INDEX
HSV2 IGG SER QL: 0.53 INDEX
LH SERPL-ACNC: 9.5 IU/L
N GONORRHOEA RRNA SPEC QL NAA+PROBE: NOT DETECTED
PROLACTIN SERPL-MCNC: 12.9 NG/ML
PROLACTIN SERPL-MCNC: 25.8 NG/ML
SOURCE AMPLIFICATION: NORMAL
T PALLIDUM AB SER QL IA: POSITIVE
T VAGINALIS VAG QL WET PREP: NOT DETECTED
TESTOST BND SERPL-MCNC: 0.9 PG/ML
TESTOSTERONE TOTAL S: 7 NG/DL
TSH SERPL-ACNC: 2.56 UIU/ML

## 2021-09-21 ENCOUNTER — NON-APPOINTMENT (OUTPATIENT)
Age: 31
End: 2021-09-21

## 2021-09-21 ENCOUNTER — APPOINTMENT (OUTPATIENT)
Dept: OBGYN | Facility: CLINIC | Age: 31
End: 2021-09-21
Payer: COMMERCIAL

## 2021-09-21 VITALS
WEIGHT: 185 LBS | HEIGHT: 66 IN | SYSTOLIC BLOOD PRESSURE: 110 MMHG | DIASTOLIC BLOOD PRESSURE: 70 MMHG | BODY MASS INDEX: 29.73 KG/M2

## 2021-09-21 DIAGNOSIS — Z12.39 ENCOUNTER FOR OTHER SCREENING FOR MALIGNANT NEOPLASM OF BREAST: ICD-10-CM

## 2021-09-21 DIAGNOSIS — R10.2 PELVIC AND PERINEAL PAIN: ICD-10-CM

## 2021-09-21 DIAGNOSIS — Z00.00 ENCOUNTER FOR GENERAL ADULT MEDICAL EXAMINATION W/OUT ABNORMAL FINDINGS: ICD-10-CM

## 2021-09-21 DIAGNOSIS — Z01.419 ENCOUNTER FOR GYNECOLOGICAL EXAMINATION (GENERAL) (ROUTINE) W/OUT ABNORMAL FINDINGS: ICD-10-CM

## 2021-09-21 DIAGNOSIS — N89.8 OTHER SPECIFIED NONINFLAMMATORY DISORDERS OF VAGINA: ICD-10-CM

## 2021-09-21 DIAGNOSIS — Z11.4 ENCOUNTER FOR SCREENING FOR HUMAN IMMUNODEFICIENCY VIRUS [HIV]: ICD-10-CM

## 2021-09-21 DIAGNOSIS — R79.89 OTHER SPECIFIED ABNORMAL FINDINGS OF BLOOD CHEMISTRY: ICD-10-CM

## 2021-09-21 DIAGNOSIS — Z87.898 PERSONAL HISTORY OF OTHER SPECIFIED CONDITIONS: ICD-10-CM

## 2021-09-21 DIAGNOSIS — Z11.51 ENCOUNTER FOR SCREENING FOR HUMAN PAPILLOMAVIRUS (HPV): ICD-10-CM

## 2021-09-21 DIAGNOSIS — Z87.42 PERSONAL HISTORY OF OTHER DISEASES OF THE FEMALE GENITAL TRACT: ICD-10-CM

## 2021-09-21 DIAGNOSIS — Z12.4 ENCOUNTER FOR SCREENING FOR MALIGNANT NEOPLASM OF CERVIX: ICD-10-CM

## 2021-09-21 PROCEDURE — 99395 PREV VISIT EST AGE 18-39: CPT | Mod: 25

## 2021-09-21 PROCEDURE — 57150 TREAT VAGINA INFECTION: CPT

## 2021-09-21 PROCEDURE — 99072 ADDL SUPL MATRL&STAF TM PHE: CPT

## 2021-09-21 RX ORDER — CLOTRIMAZOLE AND BETAMETHASONE DIPROPIONATE 10; .5 MG/G; MG/G
1-0.05 CREAM TOPICAL
Qty: 1 | Refills: 0 | Status: DISCONTINUED | COMMUNITY
Start: 2021-04-10 | End: 2021-09-21

## 2021-09-21 RX ORDER — NORETHINDRONE ACETATE AND ETHINYL ESTRADIOL AND FERROUS FUMARATE 1.5-30(21)
1.5-3 KIT ORAL
Qty: 3 | Refills: 3 | Status: ACTIVE | COMMUNITY
Start: 2020-09-17 | End: 1900-01-01

## 2021-09-21 NOTE — HISTORY OF PRESENT ILLNESS
[N] : Patient does not use contraception [Y] : Positive pregnancy history [Menarche Age: ____] : age at menarche was [unfilled] [Currently Active] : currently active [Men] : men [Vaginal] : vaginal [No] : No [Patient refuses STI testing] : Patient refuses STI testing [PapSmeardate] : 9/17/20 [TextBox_31] : neg [GonorrheaDate] : 6/21/21 [ChlamydiaDate] : 6/21/21 [TextBox_63] : neg [TextBox_68] : neg [HPVDate] : 9/17/20 [TextBox_78] : neg [LMPDate] : 9/1021 [PGxTotal] : 1 [[facility] Servicesiving] : 1 [[facility] HospitalxFulerm] : 1 [FreeTextEntry1] : 9/10/21

## 2021-09-21 NOTE — PHYSICAL EXAM
[Appropriately responsive] : appropriately responsive [Alert] : alert [No Acute Distress] : no acute distress [Oriented x3] : oriented x3 [Examination Of The Breasts] : a normal appearance [No Discharge] : no discharge [No Masses] : no breast masses were palpable [Labia Majora] : normal [Labia Minora] : normal [Pink Rugae] : pink rugae [No Bleeding] : There was no active vaginal bleeding [Normal] : normal [Normal Position] : in a normal position [Uterine Adnexae] : normal [FreeTextEntry4] : small amount white/thick discharge, vault irrigated s/p pap

## 2021-09-21 NOTE — DISCUSSION/SUMMARY
[FreeTextEntry1] : The R/B/C of OBC's reviewed.  Pt encouraged to continue diet exercise regimen to continue weight loss, health risk reviewed.  Discussed BMI, overweight over 25, obese over 30.  All the pt's questions and concerns were addressed.

## 2021-09-27 ENCOUNTER — NON-APPOINTMENT (OUTPATIENT)
Age: 31
End: 2021-09-27

## 2021-09-27 LAB
C TRACH RRNA SPEC QL NAA+PROBE: NOT DETECTED
CYTOLOGY CVX/VAG DOC THIN PREP: ABNORMAL
HPV HIGH+LOW RISK DNA PNL CVX: NOT DETECTED
N GONORRHOEA RRNA SPEC QL NAA+PROBE: NOT DETECTED
SOURCE AMPLIFICATION: NORMAL
SOURCE TP AMPLIFICATION: NORMAL
T VAGINALIS RRNA SPEC QL NAA+PROBE: NOT DETECTED

## 2021-10-27 PROBLEM — Z30.9 CONTRACEPTION MANAGEMENT: Status: ACTIVE | Noted: 2020-09-17

## 2021-10-27 NOTE — HISTORY OF PRESENT ILLNESS
[Patient reported PAP Smear was normal] : Patient reported PAP Smear was normal [N] : Patient does not use contraception [Y] : Positive pregnancy history [Yes] : pregnancy [Currently Active] : currently active [Men] : men [Menarche Age: ____] : age at menarche was [unfilled] [PapSmeardate] : 09/17/2020 [TextBox_31] : NEG [LMPDate] : 05/23/2021 [PGxTotal] : 1 [[facility] CenterxFul[facility]] : 1 [[facility]] : 1 [TextBox_6] : 05/23/2021 [TextBox_9] : 14 [FreeTextEntry1] : 06/25/2021

## 2021-10-27 NOTE — PHYSICAL EXAM
[Appropriately responsive] : appropriately responsive [Alert] : alert [No Acute Distress] : no acute distress [Oriented x3] : oriented x3 [Labia Majora] : normal [Labia Minora] : normal [Discharge] : a  ~M vaginal discharge was present [Foul Smelling] : foul smelling [Normal] : normal [Uterine Adnexae] : normal

## 2021-10-27 NOTE — COUNSELING
[Nutrition/ Exercise/ Weight Management] : nutrition, exercise, weight management [Vitamins/Supplements] : vitamins/supplements [Bladder Hygiene] : bladder hygiene [Contraception/ Emergency Contraception/ Safe Sexual Practices] : contraception, emergency contraception, safe sexual practices [STD (testing, results, tx)] : STD (testing, results, tx)

## 2021-12-02 ENCOUNTER — NON-APPOINTMENT (OUTPATIENT)
Age: 31
End: 2021-12-02

## 2021-12-08 ENCOUNTER — APPOINTMENT (OUTPATIENT)
Dept: OBGYN | Facility: CLINIC | Age: 31
End: 2021-12-08
Payer: COMMERCIAL

## 2021-12-08 VITALS
SYSTOLIC BLOOD PRESSURE: 120 MMHG | WEIGHT: 195 LBS | BODY MASS INDEX: 31.34 KG/M2 | HEIGHT: 66 IN | DIASTOLIC BLOOD PRESSURE: 80 MMHG

## 2021-12-08 DIAGNOSIS — B37.3 CANDIDIASIS OF VULVA AND VAGINA: ICD-10-CM

## 2021-12-08 DIAGNOSIS — B96.89 ACUTE VAGINITIS: ICD-10-CM

## 2021-12-08 DIAGNOSIS — N76.0 ACUTE VAGINITIS: ICD-10-CM

## 2021-12-08 LAB
BILIRUB UR QL STRIP: NEGATIVE
GLUCOSE UR-MCNC: NEGATIVE
HCG UR QL: 0.2 EU/DL
HGB UR QL STRIP.AUTO: NEGATIVE
KETONES UR-MCNC: NEGATIVE
LEUKOCYTE ESTERASE UR QL STRIP: NEGATIVE
NITRITE UR QL STRIP: NEGATIVE
PH UR STRIP: 6.5
PROT UR STRIP-MCNC: NEGATIVE
SP GR UR STRIP: 1.03

## 2021-12-08 PROCEDURE — 99214 OFFICE O/P EST MOD 30 MIN: CPT

## 2021-12-08 PROCEDURE — 81003 URINALYSIS AUTO W/O SCOPE: CPT | Mod: QW

## 2021-12-08 PROCEDURE — 99072 ADDL SUPL MATRL&STAF TM PHE: CPT

## 2021-12-08 RX ORDER — FLUCONAZOLE 150 MG/1
150 TABLET ORAL
Qty: 3 | Refills: 1 | Status: ACTIVE | COMMUNITY
Start: 2021-12-08 | End: 1900-01-01

## 2021-12-08 RX ORDER — ESTRADIOL 0.1 MG/G
0.1 CREAM VAGINAL
Qty: 1 | Refills: 2 | Status: ACTIVE | COMMUNITY
Start: 2021-12-08 | End: 1900-01-01

## 2021-12-08 NOTE — PLAN
[FreeTextEntry1] : \par Subjective history and physical examination consistent with an active yeast infection and patient was directed on Diflucan which was sent to the pharmacy with direction.\par \par She also has a concurrent complaint of recurrent bacterial vaginosis and has gone through several treatments including boric acid supplementation.  Patient was written for local estrogen therapy which she is to take twice weekly, as directed.  Rx was sent to the pharmacy with direction.\par \par Cultures were collected at the time of examination patient was advised that her medications may be adjusted based on culture results.  She was given option ask questions all questions were addressed.  She understands plan of care.

## 2021-12-08 NOTE — HISTORY OF PRESENT ILLNESS
[FreeTextEntry1] : 31-year-old female presenting office for vaginal irritation and increased vaginal discharge.  Patient reports having a history of recurrent bacterial vaginosis, especially when she had a Mirena IUD, which she had removed and recovered for 1 year, but now the condition has returned.  The left side of her inner labia is inflamed and irritated.

## 2021-12-08 NOTE — PHYSICAL EXAM
[Normal] : normal [FreeTextEntry2] : Slight inflammation on the left labia minora near the clitoral marquez [FreeTextEntry4] : Vaginal examination consistent with active yeast infection

## 2021-12-10 LAB
CANDIDA VAG CYTO: NOT DETECTED
G VAGINALIS+PREV SP MTYP VAG QL MICRO: NOT DETECTED
T VAGINALIS VAG QL WET PREP: NOT DETECTED

## 2021-12-12 LAB
HHV SPEC CULT: NORMAL
HSV TYPE 1: NORMAL
HSV TYPE 2: NORMAL

## 2022-02-14 ENCOUNTER — APPOINTMENT (OUTPATIENT)
Dept: OBGYN | Facility: CLINIC | Age: 32
End: 2022-02-14
Payer: SELF-PAY

## 2022-02-14 VITALS
HEIGHT: 66 IN | DIASTOLIC BLOOD PRESSURE: 72 MMHG | BODY MASS INDEX: 32.47 KG/M2 | SYSTOLIC BLOOD PRESSURE: 120 MMHG | WEIGHT: 202 LBS

## 2022-02-14 LAB
BILIRUB UR QL STRIP: NORMAL
GLUCOSE UR-MCNC: NORMAL
HCG UR QL: 0.2 EU/DL
HGB UR QL STRIP.AUTO: NORMAL
KETONES UR-MCNC: NORMAL
LEUKOCYTE ESTERASE UR QL STRIP: NORMAL
NITRITE UR QL STRIP: NORMAL
PH UR STRIP: 6.5
PROT UR STRIP-MCNC: NORMAL
SP GR UR STRIP: 1.02

## 2022-02-14 PROCEDURE — 99213 OFFICE O/P EST LOW 20 MIN: CPT

## 2022-02-14 PROCEDURE — 99072 ADDL SUPL MATRL&STAF TM PHE: CPT

## 2022-02-14 PROCEDURE — 81003 URINALYSIS AUTO W/O SCOPE: CPT | Mod: QW

## 2022-02-14 RX ORDER — CLOTRIMAZOLE AND BETAMETHASONE DIPROPIONATE 10; .5 MG/G; MG/G
1-0.05 CREAM TOPICAL 3 TIMES DAILY
Qty: 1 | Refills: 1 | Status: ACTIVE | COMMUNITY
Start: 2022-02-14 | End: 1900-01-01

## 2022-02-14 RX ORDER — FLUCONAZOLE 150 MG/1
150 TABLET ORAL
Qty: 2 | Refills: 1 | Status: ACTIVE | COMMUNITY
Start: 2022-02-14 | End: 1900-01-01

## 2022-02-14 RX ORDER — METRONIDAZOLE 7.5 MG/G
0.75 GEL VAGINAL
Qty: 1 | Refills: 0 | Status: ACTIVE | COMMUNITY
Start: 2022-02-14 | End: 1900-01-01

## 2022-02-24 NOTE — PHYSICAL EXAM
[Appropriately responsive] : appropriately responsive [Alert] : alert [No Acute Distress] : no acute distress [Oriented x3] : oriented x3 [Labia Majora] : normal [Labia Minora] : normal [Discharge] : a  ~M vaginal discharge was present [Normal] : normal [Uterine Adnexae] : normal

## 2022-02-24 NOTE — HISTORY OF PRESENT ILLNESS
[N] : Patient reports normal menses [Menarche Age: ____] : age at menarche was [unfilled] [No] : Patient does not have concerns regarding sex [Condoms] : uses condoms [Y] : Patient is sexually active [Currently Active] : currently active [Papeardate] : 09/21/21 [TextBox_31] : NEG [GonorrheaDate] : 09/21/21 [TextBox_63] : NEG [ChlamydiaDate] : 09/21/21 [TextBox_68] : NEG [LMPDate] : 01/24/22 [PGxTotal] : 1 [[facility]xFul[facility]] : 1 [[facility]iving] : 1 [FreeTextEntry1] : 01/24/22

## 2022-02-24 NOTE — DISCUSSION/SUMMARY
[FreeTextEntry1] : Affirm done\par  Reviewed vulvar hygiene\par \par Tx started with Lotrisone, Metrogel and Diflucan

## 2022-11-28 NOTE — ED PROVIDER NOTE - NEUROLOGICAL COORDINATION
Addended by: SHAUN GONZALEZ on: 1/31/2017 09:39 AM     Modules accepted: Orders    
Fatigue
normal
Dyspnea on exertion

## 2022-12-06 NOTE — ED PROVIDER NOTE - CROS ED ENMT ALL NEG
Lab Results   Component Value Date    HGBA1C 7 5 (H) 03/08/2022     He is not currently on any antidiabetics at this time  He was unable to tolerate Ozempic, so self discontinued  His most recent fasting BS was 154 although he was not fasting (drank cherry juice prior)  I reminded him to complete ordered updated HgbA1c to assess for DM2 and need for oral antidiabetics  I also educated him on the risks and complications associated with diabetes mellitus type 2  I encouraged him to work on better diet and routine physical activity to assist in weight loss and improving blood sugars overall  - - -

## 2023-03-07 ENCOUNTER — APPOINTMENT (OUTPATIENT)
Dept: OBGYN | Facility: CLINIC | Age: 33
End: 2023-03-07
Payer: COMMERCIAL

## 2023-03-07 ENCOUNTER — LABORATORY RESULT (OUTPATIENT)
Age: 33
End: 2023-03-07

## 2023-03-07 VITALS
WEIGHT: 240.25 LBS | SYSTOLIC BLOOD PRESSURE: 130 MMHG | HEIGHT: 66 IN | DIASTOLIC BLOOD PRESSURE: 70 MMHG | BODY MASS INDEX: 38.61 KG/M2

## 2023-03-07 DIAGNOSIS — K21.9 GASTRO-ESOPHAGEAL REFLUX DISEASE W/OUT ESOPHAGITIS: ICD-10-CM

## 2023-03-07 DIAGNOSIS — O09.33 SUPERVISION OF PREGNANCY WITH INSUFFICIENT ANTENATAL CARE, THIRD TRIMESTER: ICD-10-CM

## 2023-03-07 DIAGNOSIS — Z11.3 ENCOUNTER FOR SCREENING FOR INFECTIONS WITH A PREDOMINANTLY SEXUAL MODE OF TRANSMISSION: ICD-10-CM

## 2023-03-07 PROCEDURE — 0500F INITIAL PRENATAL CARE VISIT: CPT

## 2023-03-07 PROCEDURE — 36415 COLL VENOUS BLD VENIPUNCTURE: CPT

## 2023-03-07 RX ORDER — FAMOTIDINE 20 MG/1
20 TABLET, FILM COATED ORAL TWICE DAILY
Qty: 60 | Refills: 3 | Status: ACTIVE | COMMUNITY
Start: 2023-03-07 | End: 1900-01-01

## 2023-03-10 ENCOUNTER — NON-APPOINTMENT (OUTPATIENT)
Age: 33
End: 2023-03-10

## 2023-03-11 ENCOUNTER — NON-APPOINTMENT (OUTPATIENT)
Age: 33
End: 2023-03-11

## 2023-03-12 ENCOUNTER — OUTPATIENT (OUTPATIENT)
Dept: INPATIENT UNIT | Facility: HOSPITAL | Age: 33
LOS: 1 days | End: 2023-03-12
Payer: COMMERCIAL

## 2023-03-12 VITALS
SYSTOLIC BLOOD PRESSURE: 118 MMHG | RESPIRATION RATE: 22 BRPM | DIASTOLIC BLOOD PRESSURE: 75 MMHG | TEMPERATURE: 98 F | HEART RATE: 90 BPM

## 2023-03-12 VITALS — TEMPERATURE: 98 F | DIASTOLIC BLOOD PRESSURE: 71 MMHG | SYSTOLIC BLOOD PRESSURE: 129 MMHG | HEART RATE: 83 BPM

## 2023-03-12 DIAGNOSIS — O47.03 FALSE LABOR BEFORE 37 COMPLETED WEEKS OF GESTATION, THIRD TRIMESTER: ICD-10-CM

## 2023-03-12 PROCEDURE — G0463: CPT

## 2023-03-12 PROCEDURE — 59025 FETAL NON-STRESS TEST: CPT

## 2023-03-12 PROCEDURE — 96372 THER/PROPH/DIAG INJ SC/IM: CPT

## 2023-03-12 RX ORDER — SODIUM CHLORIDE 9 MG/ML
1000 INJECTION, SOLUTION INTRAVENOUS ONCE
Refills: 0 | Status: COMPLETED | OUTPATIENT
Start: 2023-03-12 | End: 2023-03-12

## 2023-03-12 RX ADMIN — SODIUM CHLORIDE 1000 MILLILITER(S): 9 INJECTION, SOLUTION INTRAVENOUS at 03:19

## 2023-03-12 RX ADMIN — Medication 12 MILLIGRAM(S): at 03:19

## 2023-03-12 NOTE — OB RN TRIAGE NOTE - NSICDXPASTMEDICALHX_GEN_ALL_CORE_FT
PAST MEDICAL HISTORY:  No pertinent past medical history      (normal spontaneous vaginal delivery)

## 2023-03-12 NOTE — OB PROVIDER TRIAGE NOTE - HISTORY OF PRESENT ILLNESS
at 35w0d presenting with lower abdominal cramping that started at 10pm.   Denies any VB/LOF/dysuria/abnormal dishcarge, +FM.     Late transfer of care to [place].     ObGynHx: VD x1 (, uncomplicated)   PMH/PSH: unremarkable   NKDA   Meds: PNV

## 2023-03-12 NOTE — OB RN TRIAGE NOTE - NSNURSINGINSTR_OBGYN_ALL_OB_FT
As per Kimo SANCHEZ, return to [facility] for betamethason dose #2 on 3/13 @0300.  Patient states understanding.

## 2023-03-12 NOTE — OB RN TRIAGE NOTE - INTERNATIONAL TRAVEL
Tylenol 3ml every 6 hours    Physical development  Your 4-month-old can:  · Hold the head upright and keep it steady without support.  · Lift the chest off of the floor or mattress when lying on the stomach.  · Sit when propped up (the back may be curved forward).  · Bring his or her hands and objects to the mouth.  · Hold, shake, and bang a rattle with his or her hand.  · Reach for a toy with one hand.  · Roll from his or her back to the side. He or she will begin to roll from the stomach to the back.  Social and emotional development  Your 4-month-old:  · Recognizes parents by sight and voice.  · Looks at the face and eyes of the person speaking to him or her.  · Looks at faces longer than objects.  · Smiles socially and laughs spontaneously in play.  · Enjoys playing and may cry if you stop playing with him or her.  · Cries in different ways to communicate hunger, fatigue, and pain. Crying starts to decrease at this age.  Cognitive and language development  · Your baby starts to vocalize different sounds or sound patterns (babble) and copy sounds that he or she hears.  · Your baby will turn his or her head towards someone who is talking.  Encouraging development  · Place your baby on his or her tummy for supervised periods during the day. This prevents the development of a flat spot on the back of the head. It also helps muscle development.  · Hold, cuddle, and interact with your baby. Encourage his or her caregivers to do the same. This develops your baby's social skills and emotional attachment to his or her parents and caregivers.  · Recite, nursery rhymes, sing songs, and read books daily to your baby. Choose books with interesting pictures, colors, and textures.  · Place your baby in front of an unbreakable mirror to play.  · Provide your baby with bright-colored toys that are safe to hold and put in the mouth.  · Repeat sounds that your baby makes back to him or her.  · Take your baby on walks or car rides  outside of your home. Point to and talk about people and objects that you see.  · Talk and play with your baby.  Recommended immunizations  · Hepatitis B vaccine--Doses should be obtained only if needed to catch up on missed doses.  · Rotavirus vaccine--The second dose of a 2-dose or 3-dose series should be obtained. The second dose should be obtained no earlier than 4 weeks after the first dose. The final dose in a 2-dose or 3-dose series has to be obtained before 8 months of age. Immunization should not be started for infants aged 15 weeks and older.  · Diphtheria and tetanus toxoids and acellular pertussis (DTaP) vaccine--The second dose of a 5-dose series should be obtained. The second dose should be obtained no earlier than 4 weeks after the first dose.  · Haemophilus influenzae type b (Hib) vaccine--The second dose of this 2-dose series and booster dose or 3-dose series and booster dose should be obtained. The second dose should be obtained no earlier than 4 weeks after the first dose.  · Pneumococcal conjugate (PCV13) vaccine--The second dose of this 4-dose series should be obtained no earlier than 4 weeks after the first dose.  · Inactivated poliovirus vaccine--The second dose of this 4-dose series should be obtained no earlier than 4 weeks after the first dose.  · Meningococcal conjugate vaccine--Infants who have certain high-risk conditions, are present during an outbreak, or are traveling to a country with a high rate of meningitis should obtain the vaccine.  Testing  Your baby may be screened for anemia depending on risk factors.  Nutrition  Breastfeeding and Formula-Feeding  · In most cases, exclusive breastfeeding is recommended for you and your child for optimal growth, development, and health. Exclusive breastfeeding is when a child receives only breast milk--no formula--for nutrition. It is recommended that exclusive breastfeeding continues until your child is 6 months old. Breastfeeding can  continue up to 1 year or more, but children 6 months or older will need solid food in addition to breast milk to meet their nutritional needs.  · Talk with your health care provider if exclusive breastfeeding does not work for you. Your health care provider may recommend infant formula or breast milk from other sources. Breast milk, infant formula, or a combination of the two can provide all of the nutrients that your baby needs for the first several months of life. Talk with your lactation consultant or health care provider about your baby’s nutrition needs.  · Most 4-month-olds feed every 4-5 hours during the day.  · When breastfeeding, vitamin D supplements are recommended for the mother and the baby. Babies who drink less than 32 oz (about 1 L) of formula each day also require a vitamin D supplement.  · When breastfeeding, make sure to maintain a well-balanced diet and to be aware of what you eat and drink. Things can pass to your baby through the breast milk. Avoid fish that are high in mercury, alcohol, and caffeine.  · If you have a medical condition or take any medicines, ask your health care provider if it is okay to breastfeed.  Introducing Your Baby to New Liquids and Foods  · Do not add water, juice, or solid foods to your baby's diet until directed by your health care provider.  · Your baby is ready for solid foods when he or she:  ¨ Is able to sit with minimal support.  ¨ Has good head control.  ¨ Is able to turn his or her head away when full.  ¨ Is able to move a small amount of pureed food from the front of the mouth to the back without spitting it back out.  · If your health care provider recommends introduction of solids before your baby is 6 months:  ¨ Introduce only one new food at a time.  ¨ Use only single-ingredient foods so that you are able to determine if the baby is having an allergic reaction to a given food.  · A serving size for babies is ½-1 Tbsp (7.5-15 mL). When first introduced to  solids, your baby may take only 1-2 spoonfuls. Offer food 2-3 times a day.  ¨ Give your baby commercial baby foods or home-prepared pureed meats, vegetables, and fruits.  ¨ You may give your baby iron-fortified infant cereal once or twice a day.  · You may need to introduce a new food 10-15 times before your baby will like it. If your baby seems uninterested or frustrated with food, take a break and try again at a later time.  · Do not introduce honey, peanut butter, or citrus fruit into your baby's diet until he or she is at least 1 year old.  · Do not add seasoning to your baby's foods.  · Do not give your baby nuts, large pieces of fruit or vegetables, or round, sliced foods. These may cause your baby to choke.  · Do not force your baby to finish every bite. Respect your baby when he or she is refusing food (your baby is refusing food when he or she turns his or her head away from the spoon).  Oral health  · Clean your baby's gums with a soft cloth or piece of gauze once or twice a day. You do not need to use toothpaste.  · If your water supply does not contain fluoride, ask your health care provider if you should give your infant a fluoride supplement (a supplement is often not recommended until after 6 months of age).  · Teething may begin, accompanied by drooling and gnawing. Use a cold teething ring if your baby is teething and has sore gums.  Skin care  · Protect your baby from sun exposure by dressing him or her in weather-appropriate clothing, hats, or other coverings. Avoid taking your baby outdoors during peak sun hours. A sunburn can lead to more serious skin problems later in life.  · Sunscreens are not recommended for babies younger than 6 months.  Sleep  · The safest way for your baby to sleep is on his or her back. Placing your baby on his or her back reduces the chance of sudden infant death syndrome (SIDS), or crib death.  · At this age most babies take 2-3 naps each day. They sleep between 14-15  hours per day, and start sleeping 7-8 hours per night.  · Keep nap and bedtime routines consistent.  · Lay your baby to sleep when he or she is drowsy but not completely asleep so he or she can learn to self-soothe.  · If your baby wakes during the night, try soothing him or her with touch (not by picking him or her up). Cuddling, feeding, or talking to your baby during the night may increase night waking.  · All crib mobiles and decorations should be firmly fastened. They should not have any removable parts.  · Keep soft objects or loose bedding, such as pillows, bumper pads, blankets, or stuffed animals out of the crib or bassinet. Objects in a crib or bassinet can make it difficult for your baby to breathe.  · Use a firm, tight-fitting mattress. Never use a water bed, couch, or bean bag as a sleeping place for your baby. These furniture pieces can block your baby's breathing passages, causing him or her to suffocate.  · Do not allow your baby to share a bed with adults or other children.  Safety  · Create a safe environment for your baby.  ¨ Set your home water heater at 120° F (49° C).  ¨ Provide a tobacco-free and drug-free environment.  ¨ Equip your home with smoke detectors and change the batteries regularly.  ¨ Secure dangling electrical cords, window blind cords, or phone cords.  ¨ Install a gate at the top of all stairs to help prevent falls. Install a fence with a self-latching gate around your pool, if you have one.  ¨ Keep all medicines, poisons, chemicals, and cleaning products capped and out of reach of your baby.  · Never leave your baby on a high surface (such as a bed, couch, or counter). Your baby could fall.  · Do not put your baby in a baby walker. Baby walkers may allow your child to access safety hazards. They do not promote earlier walking and may interfere with motor skills needed for walking. They may also cause falls. Stationary seats may be used for brief periods.  · When driving, always  keep your baby restrained in a car seat. Use a rear-facing car seat until your child is at least 2 years old or reaches the upper weight or height limit of the seat. The car seat should be in the middle of the back seat of your vehicle. It should never be placed in the front seat of a vehicle with front-seat air bags.  · Be careful when handling hot liquids and sharp objects around your baby.  · Supervise your baby at all times, including during bath time. Do not expect older children to supervise your baby.  · Know the number for the poison control center in your area and keep it by the phone or on your refrigerator.  When to get help  Call your baby's health care provider if your baby shows any signs of illness or has a fever. Do not give your baby medicines unless your health care provider says it is okay.  What's next  Your next visit should be when your child is 6 months old.  This information is not intended to replace advice given to you by your health care provider. Make sure you discuss any questions you have with your health care provider.  Document Released: 01/07/2008 Document Revised: 05/03/2016 Document Reviewed: 08/27/2014  Elsevier Interactive Patient Education © 2017 [employer]     No

## 2023-03-12 NOTE — OB RN TRIAGE NOTE - NSICDXFAMILYHX_GEN_ALL_CORE_FT
FAMILY HISTORY:  Father  Still living? Unknown  Family history of diabetes mellitus, Age at diagnosis: Age Unknown    Grandparent  Still living? Unknown  Family history of diabetes mellitus, Age at diagnosis: Age Unknown

## 2023-03-12 NOTE — OB PROVIDER TRIAGE NOTE - NSHPPHYSICALEXAM_GEN_ALL_CORE
Vital Signs Last 24 Hrs  T(C): 36.9 (12 Mar 2023 01:04), Max: 36.9 (12 Mar 2023 01:04)  T(F): 98.4 (12 Mar 2023 01:04), Max: 98.4 (12 Mar 2023 01:04)  HR: 83 (12 Mar 2023 04:46) (83 - 90)  BP: 129/71 (12 Mar 2023 04:46) (118/75 - 129/71)  RR: 22 (12 Mar 2023 01:04) (22 - 22)    Abdomen: soft, no palpable contractions   SVE: 1/20/-3     FHT: baseline 120, moderate variability, +accels, no decels  [place]: jamila irregularly

## 2023-03-12 NOTE — OB PROVIDER TRIAGE NOTE - NSOBPROVIDERNOTE_OBGYN_ALL_OB_FT
at 35w0d, evaluated in setting of  contractions.   Not in labor.   Unremarkable cervical exam, no cervical change during 4hr evaluation period.   FHT [place]/reactive   [place] with irregular activity that resolved after PO hydration.   No other OB issues/complaints.   Labor/RTC precautions reviewed.   Cleared for discharge.   Next scheduled PNC follow up 3/21.  at 35w0d, evaluated in setting of  contractions.   Not in labor.   Unremarkable cervical exam, no cervical change during 4hr evaluation period.   FHT [place]/reactive   [place] with irregular activity that resolved after IV hydration.   Patient received 1 dose of betamethasone in the setting of possible  labor. She is to return in 24 hours for second dose  No other OB issues/complaints.   Labor/RTC precautions reviewed.   Cleared for discharge.   Next scheduled PNC follow up 3/21.

## 2023-03-13 ENCOUNTER — OUTPATIENT (OUTPATIENT)
Dept: INPATIENT UNIT | Facility: HOSPITAL | Age: 33
LOS: 1 days | End: 2023-03-13
Payer: COMMERCIAL

## 2023-03-13 VITALS
HEART RATE: 86 BPM | SYSTOLIC BLOOD PRESSURE: 128 MMHG | RESPIRATION RATE: 18 BRPM | DIASTOLIC BLOOD PRESSURE: 75 MMHG | TEMPERATURE: 98 F

## 2023-03-13 VITALS — SYSTOLIC BLOOD PRESSURE: 128 MMHG | DIASTOLIC BLOOD PRESSURE: 75 MMHG | TEMPERATURE: 98 F | HEART RATE: 86 BPM

## 2023-03-13 DIAGNOSIS — O47.03 FALSE LABOR BEFORE 37 COMPLETED WEEKS OF GESTATION, THIRD TRIMESTER: ICD-10-CM

## 2023-03-13 PROCEDURE — G0463: CPT

## 2023-03-13 PROCEDURE — 59025 FETAL NON-STRESS TEST: CPT

## 2023-03-13 PROCEDURE — 96372 THER/PROPH/DIAG INJ SC/IM: CPT

## 2023-03-13 PROCEDURE — 99234 HOSP IP/OBS SM DT SF/LOW 45: CPT

## 2023-03-13 RX ADMIN — Medication 12 MILLIGRAM(S): at 02:45

## 2023-03-13 NOTE — OB PROVIDER TRIAGE NOTE - HISTORY OF PRESENT ILLNESS
33yo  at 35w1d presenting for betamethasone #2. She was seen yesterday and given betamethasone#1 for  contractions without cervical change. Today, she states she feels better and has not had any pain or contractions. She denies vaginal bleeding, leakage of fluid. She reports good fetal movement.     Late transfer of care to [place].     ObHx:  x1 (, uncomplicated)   GynHx: Denies  PMH: Denies  PSH: Denies   Meds: PNV  Allergies: NKDA

## 2023-03-13 NOTE — OB PROVIDER TRIAGE NOTE - NSHPPHYSICALEXAM_GEN_ALL_CORE
Vital Signs Last 24 Hrs  T(C): 36.8 (13 Mar 2023 02:33), Max: 36.9 (12 Mar 2023 04:46)  T(F): 98.2 (13 Mar 2023 02:33), Max: 98.42 (12 Mar 2023 04:46)  HR: 86 (13 Mar 2023 02:33) (83 - 86)  BP: 128/75 (13 Mar 2023 02:33) (128/75 - 129/71)  BP(mean): --  RR: 18 (13 Mar 2023 02:33) (18 - 18)  SpO2: --    Parameters below as of 13 Mar 2023 02:33  Patient On (Oxygen Delivery Method): room air    Gen: AOx3, appears comfortable  Abd: Soft, gravid, no contractions palpated  Bedside sono: vertex, posterior placenta  FHT: 135/mod andrey/+accels/-decels  [place]: none

## 2023-03-13 NOTE — OB PROVIDER TRIAGE NOTE - NSOBPROVIDERNOTE_OBGYN_ALL_OB_FT
33yo  at 35w1d presenting for betamethasone #2 for  contractions yesterday now resolved.  -Vital signs stable  -s/p BMZ#2  -FHT category I  -No contractions on toco  -No concern for  labor  -Provided  labor return precuations  -Health system visit scheduled for 3/21  Dispo: Discharge to home    Discussed with Dr. George 31yo  at 35w1d presenting for betamethasone #2 for  contractions yesterday now resolved.  -Vital signs stable  -s/p BMZ#2  -FHT category I  -No contractions on toco  -No concern for  labor  -Provided  labor return precuations  -[facility] visit scheduled for 3/21  Dispo: Discharge to home    Discussed with Dr. George    Addendum:    Subjective Hx, Physical Exam, Laboratory & Imaging results reviewed.  I agree with the Resident Physician's assessment and plan of care, as discussed above.  Call, follow up, and return to Hospital parameters reviewed at length.  She was given the opportunity to ask questions and all were addressed.  Discharge home    Alexandre George, DO

## 2023-03-13 NOTE — OB PROVIDER TRIAGE NOTE - NS_LMP_OBGYN_ALL_OB_DT
Patient called and stated she would like to see Dr. Cheko Boogie.   I placed an order for you to sign
16-Jul-2022

## 2023-03-14 DIAGNOSIS — Z28.39 SUPERVISION OF OTHER HIGH RISK PREGNANCIES, UNSPECIFIED TRIMESTER: ICD-10-CM

## 2023-03-14 DIAGNOSIS — O09.899 SUPERVISION OF OTHER HIGH RISK PREGNANCIES, UNSPECIFIED TRIMESTER: ICD-10-CM

## 2023-03-14 LAB
ABO + RH PNL BLD: NORMAL
AR GENE MUT ANL BLD/T: NORMAL
BACTERIA UR CULT: NORMAL
BASOPHILS # BLD AUTO: 0.04 K/UL
BASOPHILS NFR BLD AUTO: 0.3 %
BILIRUB UR QL STRIP: NORMAL
BLD GP AB SCN SERPL QL: NORMAL
C TRACH RRNA SPEC QL NAA+PROBE: NOT DETECTED
CFTR MUT TESTED BLD/T: NEGATIVE
DRUG ABUSE PANEL-9, SERUM: NORMAL
EOSINOPHIL # BLD AUTO: 0.26 K/UL
EOSINOPHIL NFR BLD AUTO: 2.2 %
ESTIMATED AVERAGE GLUCOSE: 111 MG/DL
FMR1 GENE MUT ANL BLD/T: NORMAL
GLUCOSE UR-MCNC: NORMAL
HBA1C MFR BLD HPLC: 5.5 %
HBV SURFACE AG SER QL: NONREACTIVE
HCG UR QL: 0.2 EU/DL
HCT VFR BLD CALC: 32.9 %
HCV AB SER QL: NONREACTIVE
HCV S/CO RATIO: 0.11 S/CO
HGB A MFR BLD: 97.6 %
HGB A2 MFR BLD: 2.4 %
HGB BLD-MCNC: 11.1 G/DL
HGB FRACT BLD-IMP: NORMAL
HGB UR QL STRIP.AUTO: NORMAL
HIV1+2 AB SPEC QL IA.RAPID: NONREACTIVE
IMM GRANULOCYTES NFR BLD AUTO: 1.7 %
KETONES UR-MCNC: NORMAL
LEUKOCYTE ESTERASE UR QL STRIP: NORMAL
LYMPHOCYTES # BLD AUTO: 1.28 K/UL
LYMPHOCYTES NFR BLD AUTO: 10.7 %
M TB IFN-G BLD-IMP: NEGATIVE
MAN DIFF?: NORMAL
MCHC RBC-ENTMCNC: 27.4 PG
MCHC RBC-ENTMCNC: 33.7 GM/DL
MCV RBC AUTO: 81.2 FL
MEV IGG FLD QL IA: 23.3 AU/ML
MEV IGG+IGM SER-IMP: POSITIVE
MONOCYTES # BLD AUTO: 0.91 K/UL
MONOCYTES NFR BLD AUTO: 7.6 %
N GONORRHOEA RRNA SPEC QL NAA+PROBE: NOT DETECTED
NEUTROPHILS # BLD AUTO: 9.31 K/UL
NEUTROPHILS NFR BLD AUTO: 77.5 %
NITRITE UR QL STRIP: NORMAL
PH UR STRIP: 8.5
PLATELET # BLD AUTO: 223 K/UL
PROT UR STRIP-MCNC: ABNORMAL
QUANTIFERON TB PLUS MITOGEN MINUS NIL: 1.16 IU/ML
QUANTIFERON TB PLUS NIL: 0.02 IU/ML
QUANTIFERON TB PLUS TB1 MINUS NIL: 0 IU/ML
QUANTIFERON TB PLUS TB2 MINUS NIL: 0 IU/ML
RBC # BLD: 4.05 M/UL
RBC # FLD: 15.5 %
RUBV IGG FLD-ACNC: 0.5 INDEX
RUBV IGG SER-IMP: NEGATIVE
SOURCE AMPLIFICATION: NORMAL
SP GR UR STRIP: 1.01
T PALLIDUM AB SER QL IA: POSITIVE
TSH SERPL-ACNC: 1.18 UIU/ML
VZV AB TITR SER: POSITIVE
VZV IGG SER IF-ACNC: 1119 INDEX
WBC # FLD AUTO: 12.01 K/UL

## 2023-03-21 ENCOUNTER — NON-APPOINTMENT (OUTPATIENT)
Age: 33
End: 2023-03-21

## 2023-03-21 ENCOUNTER — APPOINTMENT (OUTPATIENT)
Dept: OBGYN | Facility: CLINIC | Age: 33
End: 2023-03-21
Payer: COMMERCIAL

## 2023-03-21 ENCOUNTER — LABORATORY RESULT (OUTPATIENT)
Age: 33
End: 2023-03-21

## 2023-03-21 LAB
BILIRUB UR QL STRIP: NORMAL
GLUCOSE UR-MCNC: NORMAL
HCG UR QL: 0.2 EU/DL
HGB UR QL STRIP.AUTO: NORMAL
KETONES UR-MCNC: NORMAL
LEUKOCYTE ESTERASE UR QL STRIP: NORMAL
NITRITE UR QL STRIP: NORMAL
PH UR STRIP: 7
PROT UR STRIP-MCNC: NORMAL
SP GR UR STRIP: 1.02

## 2023-03-21 PROCEDURE — 0502F SUBSEQUENT PRENATAL CARE: CPT

## 2023-03-21 PROCEDURE — 36415 COLL VENOUS BLD VENIPUNCTURE: CPT

## 2023-03-24 NOTE — END OF VISIT
Abdomen , soft, nontender, nondistended , no guarding or rigidity , no masses palpable , normal bowel sounds , Liver and Spleen , no hepatomegaly present , no hepatosplenomegaly , liver nontender , spleen not palpable
[Time Spent: ___ minutes] : I have spent [unfilled] minutes of time on the encounter.

## 2023-03-29 ENCOUNTER — NON-APPOINTMENT (OUTPATIENT)
Age: 33
End: 2023-03-29

## 2023-03-29 DIAGNOSIS — R76.8 OTHER SPECIFIED ABNORMAL IMMUNOLOGICAL FINDINGS IN SERUM: ICD-10-CM

## 2023-03-29 LAB
B-HEM STREP SPEC QL CULT: ABNORMAL
HIV1+2 AB SPEC QL IA.RAPID: NONREACTIVE
T PALLIDUM AB SER QL IA: POSITIVE

## 2023-03-30 ENCOUNTER — APPOINTMENT (OUTPATIENT)
Dept: OBGYN | Facility: CLINIC | Age: 33
End: 2023-03-30
Payer: COMMERCIAL

## 2023-03-30 ENCOUNTER — APPOINTMENT (OUTPATIENT)
Dept: ANTEPARTUM | Facility: CLINIC | Age: 33
End: 2023-03-30
Payer: COMMERCIAL

## 2023-03-30 ENCOUNTER — ASOB RESULT (OUTPATIENT)
Age: 33
End: 2023-03-30

## 2023-03-30 VITALS
HEIGHT: 66 IN | WEIGHT: 241.5 LBS | SYSTOLIC BLOOD PRESSURE: 120 MMHG | BODY MASS INDEX: 38.81 KG/M2 | DIASTOLIC BLOOD PRESSURE: 82 MMHG

## 2023-03-30 DIAGNOSIS — E66.9 OBESITY, UNSPECIFIED: ICD-10-CM

## 2023-03-30 PROCEDURE — 0502F SUBSEQUENT PRENATAL CARE: CPT

## 2023-03-30 PROCEDURE — 59025 FETAL NON-STRESS TEST: CPT

## 2023-03-30 PROCEDURE — 76819 FETAL BIOPHYS PROFIL W/O NST: CPT

## 2023-03-30 PROCEDURE — 76816 OB US FOLLOW-UP PER FETUS: CPT

## 2023-04-05 ENCOUNTER — APPOINTMENT (OUTPATIENT)
Dept: OBGYN | Facility: CLINIC | Age: 33
End: 2023-04-05

## 2023-04-07 ENCOUNTER — NON-APPOINTMENT (OUTPATIENT)
Age: 33
End: 2023-04-07

## 2023-04-07 ENCOUNTER — APPOINTMENT (OUTPATIENT)
Dept: OBGYN | Facility: CLINIC | Age: 33
End: 2023-04-07
Payer: COMMERCIAL

## 2023-04-07 ENCOUNTER — ASOB RESULT (OUTPATIENT)
Age: 33
End: 2023-04-07

## 2023-04-07 ENCOUNTER — APPOINTMENT (OUTPATIENT)
Dept: ANTEPARTUM | Facility: CLINIC | Age: 33
End: 2023-04-07
Payer: COMMERCIAL

## 2023-04-07 PROCEDURE — 59425 ANTEPARTUM CARE ONLY: CPT

## 2023-04-07 PROCEDURE — 0502F SUBSEQUENT PRENATAL CARE: CPT

## 2023-04-07 PROCEDURE — 76819 FETAL BIOPHYS PROFIL W/O NST: CPT

## 2023-04-07 PROCEDURE — 59025 FETAL NON-STRESS TEST: CPT

## 2023-04-09 ENCOUNTER — INPATIENT (INPATIENT)
Facility: HOSPITAL | Age: 33
LOS: 2 days | Discharge: HOME | End: 2023-04-12
Attending: STUDENT IN AN ORGANIZED HEALTH CARE EDUCATION/TRAINING PROGRAM | Admitting: STUDENT IN AN ORGANIZED HEALTH CARE EDUCATION/TRAINING PROGRAM
Payer: COMMERCIAL

## 2023-04-09 VITALS — HEART RATE: 95 BPM | DIASTOLIC BLOOD PRESSURE: 83 MMHG | SYSTOLIC BLOOD PRESSURE: 139 MMHG | RESPIRATION RATE: 17 BRPM

## 2023-04-09 DIAGNOSIS — O47.1 FALSE LABOR AT OR AFTER 37 COMPLETED WEEKS OF GESTATION: ICD-10-CM

## 2023-04-09 LAB
BILIRUB UR QL STRIP: NORMAL
CLARITY UR: CLEAR
COLLECTION METHOD: NORMAL
GLUCOSE UR-MCNC: NORMAL
HCG UR QL: 0.2 EU/DL
HGB UR QL STRIP.AUTO: NORMAL
KETONES UR-MCNC: NORMAL
LEUKOCYTE ESTERASE UR QL STRIP: NORMAL
NITRITE UR QL STRIP: NORMAL
PH UR STRIP: 7.5
PROT UR STRIP-MCNC: NORMAL
SP GR UR STRIP: 1.01

## 2023-04-09 RX ORDER — SODIUM CHLORIDE 9 MG/ML
1000 INJECTION, SOLUTION INTRAVENOUS
Refills: 0 | Status: DISCONTINUED | OUTPATIENT
Start: 2023-04-09 | End: 2023-04-11

## 2023-04-09 RX ORDER — CHLORHEXIDINE GLUCONATE 213 G/1000ML
1 SOLUTION TOPICAL ONCE
Refills: 0 | Status: DISCONTINUED | OUTPATIENT
Start: 2023-04-09 | End: 2023-04-11

## 2023-04-09 RX ORDER — AMPICILLIN TRIHYDRATE 250 MG
2 CAPSULE ORAL ONCE
Refills: 0 | Status: COMPLETED | OUTPATIENT
Start: 2023-04-09 | End: 2023-04-09

## 2023-04-09 RX ORDER — AMPICILLIN TRIHYDRATE 250 MG
1 CAPSULE ORAL EVERY 4 HOURS
Refills: 0 | Status: DISCONTINUED | OUTPATIENT
Start: 2023-04-09 | End: 2023-04-11

## 2023-04-09 RX ORDER — OXYTOCIN 10 UNIT/ML
333.33 VIAL (ML) INJECTION
Qty: 20 | Refills: 0 | Status: COMPLETED | OUTPATIENT
Start: 2023-04-09 | End: 2023-04-10

## 2023-04-09 RX ORDER — CITRIC ACID/SODIUM CITRATE 300-500 MG
30 SOLUTION, ORAL ORAL ONCE
Refills: 0 | Status: DISCONTINUED | OUTPATIENT
Start: 2023-04-09 | End: 2023-04-11

## 2023-04-09 NOTE — OB PROVIDER H&P - HISTORY OF PRESENT ILLNESS
NAT REDDY is a 32y  at 39.0 weeks GA who presents to L&D for decreased fetal movement. Per the patient, the last time she felt the baby move was yesterday evening. She denies any vaginal bleeding, contractions or leakage of fluid. She endorses mild headaches lasting a few minutes at a time over the past three days, but denies visual disturbances, RUQ pain, epigastric pain and new-onset edema. The patient recently moved back to [place] from [place] and has changed providers 3x, but reports that she had borderline blood pressures when in [place], but never received a diagnosis of gestational HTN or preeclampsia. She is also GBS+.    TERESSA: 2023  LMP: 7/10/2022    PMH: None  PSH: None  Meds: PNVs  All: None    BMI: _____  Sono: Vertex, right lateral placenta; BPP 6/8, ASHOK 8  EFW: 4000g NAT REDDY is a 32y  at 39.0 weeks GA who presents to L&D for decreased fetal movement. Per the patient, the last time she felt the baby move was yesterday evening. She denies any vaginal bleeding, contractions or leakage of fluid. She endorses mild headaches lasting a few minutes at a time over the past three days, but denies visual disturbances, RUQ pain, epigastric pain and new-onset edema. The patient recently moved back to [place] from [place] and has changed providers 3x, but reports that she had borderline blood pressures when in [place], but never received a diagnosis of gestational HTN or preeclampsia. She is also GBS+.    TERESSA: 2023  LMP: 7/10/2022    Pregnancy course:  LGA - 3/30 sono EFW 3735g, 94%ile (AC 87%ile)    PMH: None  PSH: None  Meds: PNVs  All: None    BMI: 39.6  Sono (): vertex, posterior fundal placenta, BPP   EFW: 4000g

## 2023-04-09 NOTE — OB PROVIDER H&P - NS_OBGYNHISTORY_OBGYN_ALL_OB_FT
POB: Prior full-term spontaneous vaginal delivery in 2013 at 38 weeks, 7lbs 5oz. Complications in pregnancy per HPI above.  PGYN: Denies history of fibroids or cysts, denies STD hx, denies abnormal PAPs

## 2023-04-09 NOTE — OB PROVIDER H&P - NSHPPHYSICALEXAM_GEN_ALL_CORE
T(C): 37.1 (04-09-23 @ 22:15), Max: 37.1 (04-09-23 @ 22:15)  HR: 95 (04-09-23 @ 21:59) (95 - 95)  BP: 139/83 (04-09-23 @ 21:59) (139/83 - 139/83)  RR: 17 (04-09-23 @ 21:42) (17 - 17)  SpO2: --    Gen: NAD, well-appearing, AAOx3   Abd: Soft, gravid  Ext: non-tender, non-edematous  SVE:  ____  Bedside sono: Vertex, Right lateral placenta, BPP 6/8, ASHOK 10.67  FHT: Baseline rate 125 with moderate variability, + accels, no decels   [place]: No contractions T(C): 37.1 (04-09-23 @ 22:15), Max: 37.1 (04-09-23 @ 22:15)  HR: 95 (04-09-23 @ 21:59) (95 - 95)  BP: 139/83 (04-09-23 @ 21:59) (139/83 - 139/83)  RR: 17 (04-09-23 @ 21:42) (17 - 17)    Gen: NAD, well-appearing, AAOx3   Abd: Soft, gravid  Ext: non-tender, non-edematous  SVE:  2/50/-3  Bedside sono: Vertex, Right lateral placenta, BPP 6/8 (no tone), ASHOK 10.67  FHT: Baseline rate 125 with moderate variability, + accels, no decels   [place]: No contractions

## 2023-04-09 NOTE — OB PROVIDER H&P - ASSESSMENT
Assessment: NAT REDDY is a 32y  at 39.0 weeks GA who presents to L&D for decreased fetal movement. Based on decreased fetal movement with BPP 6/8, patient will be admitted for induction of labor.     Plan:   -Admit to L&D  -Consent  -Admission labs  -NPO, except ice chips   -IV fluids  -Labor: No contractions, minimal cervical changes. Patient counseled on the roles of cytotec and pitocin and likely need for both.   -Fetus: Cat I tracing. Continuous toco and fetal monitoring.   -GBS: Positive, patient will require GBS ppx, counseled as an outpatient   -Analgesia: Patient delivered last pregnancy without epidural (afraid of needles), counseled that she can change her mind at any time  -Preeclampsia labs sent, monitoring pressures     Discussed with Dr. Malin and Dr. Slater   Assessment: NAT REDDY is a 32y  at 39.0 weeks GA who presents to L&D for decreased fetal movement. Based on decreased fetal movement with BPP 6/8, patient will be admitted for induction of labor.     Plan:   -Admit to L&D  -Consent  -Admission labs  -NPO, except ice chips   -IV fluids  -Labor: No contractions, minimal cervical changes. Patient counseled on the roles of cytotec and pitocin and likely need for both.   -Fetus: Cat I tracing. Continuous toco and fetal monitoring.   -GBS: Positive, patient will require GBS ppx, counseled as an outpatient   -Analgesia: Patient delivered last pregnancy without epidural (afraid of needles), counseled that she can change her mind at any time  -elevated BP, no existing history. Preeclampsia labs sent, monitoring pressures     Discussed with Dr. Malin and Dr. Slater   Assessment: NAT REDDY is a 32y  at 39.0 weeks GA who presents to L&D for decreased fetal movement. Based on persistent decreased fetal movement, patient will be admitted for induction of labor.     Plan:   -Admit to L&D  -Consent  -Admission labs  -NPO, except ice chips   -IV fluids  -Labor: No contractions, minimal cervical changes. Patient counseled on the roles of cytotec and pitocin and likely need for both.   -Fetus: Cat I tracing. Continuous toco and fetal monitoring.   -GBS: Positive, patient will require GBS ppx, counseled as an outpatient   -Analgesia: Patient delivered last pregnancy without epidural (afraid of needles), counseled that she can change her mind at any time  -elevated BP, no existing history. Preeclampsia labs sent, monitoring pressures     Discussed with Dr. Malin and Dr. Slater

## 2023-04-09 NOTE — OB RN PATIENT PROFILE - POST PARTUM DEPRESSION SCREEN OB 3
AAO x 4. VSS, afebrile, SpO2>88% on 4L NC and BiPAP while asleep. Telemetry monitoring-SR. Remodulin infusing to R CW at 28 ng/kg/min x 85 kg or 57 mL/ 24hr; cassette last changed 12/19 at 1700. PRN Lortab administered for pain control. Fall precautions and POC reviewed with pt. See flowsheet for assessment findings. Will continue to monitor.   no

## 2023-04-09 NOTE — OB PROVIDER H&P - NS_ABDFINDING_OBGYN_ALL_OB
H: Lives at home with mom, dad, older brother, and younger sister. Feels safe at home.   E: 11th grade, wants to be a speech pathologist. Doing well in school.  A: Plays the violin in the [employer]. Swims occasionally.   D: No drug use ever, no alcohol, no tobacco, no juuling, no vaping.   S: Never sexually active   S: No suicidal or homicidal ideations
Soft, nontender

## 2023-04-09 NOTE — OB RN TRIAGE NOTE - NS_TRIAGEMEDICALSCREENINGPERFORMEDBY_OBGYN_ALL_OB_FT
0.2 - 1.2 mg/dL 0.7   Hemoglobin A1C Latest Ref Range: <5.7 % of total Hgb 4.9     Diabetes f/u:  Doing well. Taking medication w/o side effects. Preprandial-fasting EV=[id number]. 2hr postprandial OX=[phone]. HBA1c:6.0 on 11/13/17. 10.3 on 3/2/18. 4.9 on 6/1/18. FBQ=019 on 11/13/17. 89 on 2/26/18. 89 on 6/1/18. No other new associated or worsening factors. Eye check:<12mos ago. Denies polyuria/polyphagia/polydipsia/BLE skin lesions/BLE paresthesia. HTN: is doing well. Taking medication w/o side effects. No other associated factors. No additional table salt added to food. Denies cp/sob/pnd/ankle edema/dizziness      INR:taking coumadin 4mg w/o side effects. 7/13/18 INR=2.5. Anemia:see cbc above:under care of hematology. Hyperlipidemia: doing well. Diet managed. See labs above. No other new associated concerns. GERD f/u:mild:feels well. No other new associated/worsening or other improving factors. Denies abdo pain/n-v/diarrhea/melena-blood in stool. Back Pain f/u:doing well per baseline. No changes from prior visit. Chronic back pain:is still controlled well with tylenol#3 prn. Pt' reveals no aberrant drug use behavior. No other new associated or worsening factors. Denies BLE hqibvksz-kezegaemshf-kpayurplo/urinary or bowel control loss or change/saddle anesthesia/gait abnormality. Anxiety/insomnia:reports doing well. Sleeping approx 7hrs. Med helps namely,xanax. Pt' reveals no aberrant drug use behavior. OARRS reports:is consistent. Denies SI/HI/hallucinations/illicit drugs/etoh abuse/cold intolerance. COPD:is doing well. No other associated concerns. Albuterol inhaler use:last used approx 12mos ago. Denies chills/sob/cp/weakness/n-v/abdo pain/HA/dizziness/rash/neck pain-stiffness/photophobia.        Allergies   Allergen Reactions    Diclofenac Other (See Comments)     Bleed/colitis    Nsaids Other (See Comments)     CANNOT TAKE D/T GI BLEEDING AND USE OF COUMADIN    Hydrocodone-Acetaminophen Anxiety       Current Outpatient Prescriptions on File Prior to Visit   Medication Sig Dispense Refill    acetaminophen-codeine (TYLENOL #3) 300-30 MG per tablet TAKE 1 TABLET BY MOUTH THREE TIMES DAILY AS NEEDED FOR PAIN 42 tablet 0    chlorthalidone (HYGROTON) 25 MG tablet TAKE 1 TABLET BY MOUTH EVERY DAY 90 tablet 0    lisinopril (PRINIVIL;ZESTRIL) 40 MG tablet TAKE 1 TABLET BY MOUTH EVERY DAY 90 tablet 0    metFORMIN (GLUCOPHAGE) 1000 MG tablet TAKE 1 TABLET BY MOUTH TWICE DAILY 180 tablet 0    ALPRAZolam (XANAX) 0.5 MG tablet TAKE 1 TABLET BY MOUTH THREE TIMES DAILY AS NEEDED 90 tablet 0    pantoprazole (PROTONIX) 40 MG tablet TAKE 1 TABLET BY MOUTH EVERY DAY 90 tablet 0    warfarin (COUMADIN) 1 MG tablet TAKE BY MOUTH AS DIRECTED 90 tablet 1    TRUE METRIX BLOOD GLUCOSE TEST strip USE TO TEST TWICE DAILY AS DIRECTED 150 strip 0    glipiZIDE (GLUCOTROL) 5 MG tablet TAKE 1/2(ONE-HALF) TABLET BY MOUTH EVERY DAY WITH FOOD 45 tablet 0    TRUE METRIX BLOOD GLUCOSE TEST strip USE TO TEST TWICE DAILY AS DIRECTED 150 strip 0    warfarin (COUMADIN) 3 MG tablet TAKE BY MOUTH AS DIRECTED 90 tablet 0    amLODIPine (NORVASC) 5 MG tablet Take 1 tablet by mouth daily 90 tablet 3    atorvastatin (LIPITOR) 10 MG tablet Take 1 tablet by mouth daily For cholesterol:take in the evening. 90 tablet 0    promethazine (PHENERGAN) 12.5 MG tablet TAKE ONE TABLET BY MOUTH EVERY 6 HOURS AS NEEDED 28 tablet 0    albuterol (PROAIR HFA) 108 (90 BASE) MCG/ACT inhaler Inhale 1-2 puffs into the lungs every 6 hours as needed for Wheezing 1 Inhaler 1    warfarin (COUMADIN) 4 MG tablet TAKE 1 TABLET BY MOUTH DAILY 30 tablet 0    PROAIR  (90 BASE) MCG/ACT inhaler INHALE 2 PUFFS BY MOUTH EVERY 4 HOURS AS NEEDED FOR WHEEZING/ SHORTNESS OF BREATH 8.5 g 0    hydroxychloroquine (PLAQUENIL) 200 MG tablet Take  by mouth daily.       ferrous sulfate 325 (65 FE) MG EC tablet Take 325 mg by mouth 2 times daily. No current facility-administered medications on file prior to visit. Past Medical History:   Diagnosis Date    A-fib ([address]     Anemia     multifactorial:under care of heme-onc:Dr. Tenzin Rosado    Anemia:multifactorial 2011    as per heme-onc    Anxiety     Cataract     bilateral    Chronic back pain     Under care of ortho spine:Dr. Dayne Sanches    CKD (chronic kidney disease) stage 3, GFR 30-59 ml/min 1/2012    Colitis, ischemic ([address] 6/2012    Under care of Dr. Cleo FRASER     Diabetes     Diabetic eye exam ([facility] 1/28/15    CEI    GERD (gastroesophageal reflux disease)     Hyperlipidaemia LDL goal <100     Hypertension     Insomnia     Long-term (current) use of anticoagulants, INR goal 2.5-3.5     Lymphoma:monoclonal B cell 1/2012    Under care of Dr. Javier Castillo abnormal 7/30/15    Right breast mass:benign(?lipoma)per US:repeat testing in 6mos=1/30/16    Nonspecific abnormal results of kidney function study 1/25/2012    Osteoarthritis     Renal cysts, right 1/2/2014    RLS (restless legs syndrome) 10/19/11    Sciatica     Screening colonoscopy 2010;6/19/13    Nml. Next 10yrs:6/2023:Dr. Sue Chanel. 9/22/16(Dr. Waddell):nml EGD & colonoscopy except mild diverticulosis    Therapeutic drug monitoring 04/10/2015    OARRS report consistent on 4/10/15;7/6/15;10/23/15;2/7/16;5/16/16;8/19/16;11/4/16;3/6/17;6/26/17;9/20/17;12/18/17; 12/18/17;3/12/18;5/29/18    Valvular heart disease     s/p aortic and mitral valve repair. Under care of cards:Dr. Sarah Tucker.  Visit for screening mammogram 04/10/2017    negative:see scanned report.             Social History   Substance Use Topics    Smoking status: Former Smoker     Packs/day: 1.00     Years: 40.00     Types: Cigarettes     Quit date: 8/2/2007    Smokeless tobacco: Never Used    Alcohol use 0.6 oz/week     1 Cans of beer per week      Comment: occ     History   Drug Use No Review of Systems   Constitutional: Negative for activity change, appetite change, chills, diaphoresis, fatigue, fever and unexpected weight change. Eyes: Negative for visual disturbance. Respiratory: Negative for apnea, cough, choking, chest tightness, shortness of breath, wheezing and stridor. Cardiovascular: Negative for chest pain, palpitations and leg swelling. Gastrointestinal: Negative for abdominal distention, abdominal pain, anal bleeding, blood in stool, constipation, diarrhea, nausea, rectal pain and vomiting. Endocrine: Negative for cold intolerance, heat intolerance, polydipsia, polyphagia and polyuria. Genitourinary: Negative for difficulty urinating. Musculoskeletal: Positive for back pain. Skin: Negative for rash and wound. Neurological: Negative for dizziness, light-headedness and headaches. Hematological: Negative for adenopathy. Psychiatric/Behavioral: Positive for sleep disturbance. Negative for agitation, behavioral problems, confusion, decreased concentration, dysphoric mood, hallucinations, self-injury and suicidal ideas. The patient is nervous/anxious. The patient is not hyperactive. Objective:   Physical Exam   Constitutional: She is oriented to person, place, and time. Vital signs are normal. She appears well-developed and well-nourished. She is cooperative. She does not have a sickly appearance. No distress. HENT:   Nose: Nose normal.   Mouth/Throat: Uvula is midline, oropharynx is clear and moist and mucous membranes are normal.   Hearing intact to nml conversation   Eyes: Conjunctivae, EOM and lids are normal. Pupils are equal, round, and reactive to light. No scleral icterus. Neck: Trachea normal and normal range of motion. Neck supple. No JVD present. Carotid bruit is not present. Cardiovascular: Normal rate, regular rhythm, normal heart sounds, intact distal pulses and normal pulses. No bilateral leg-ankle edema. Anxiety  Stable. Pt' reveals no aberrant drug use behavior. Ok to continue [employer] refills w/q3mos office appts. 4. Hyperlipidemia with target LDL less than 100  Stable/at goal except TG. The patient is asked to make an attempt to improve diet and exercise patterns to aid in medical management of this problem. Comprehensive Metabolic Panel    Lipid Panel   5. Anemia:multifactorial  Stable. Per hematology. 6. Gastroesophageal reflux disease without esophagitis  Stable. 7. Chronic leg/back pain  Stable. Pt' reveals no aberrant drug use behavior. 8. Primary insomnia  Stable. 9. Chronic back pain   Stable. 10. Stage 3 chronic kidney disease  Stable. 11. Long-term (current) use of anticoagulants, INR goal 2.5-3.5  Stable. 12. Visit for screening mammogram  Pt' states she had done 2mos ago & was nml:she will release results to PCP. MA will also call [facility] for mammogram result. Plan:          Obtain labs/diagnostic tests as discussed today & call back for results within 2-7days. Pt' left office in good condition. Advised to go to local ER or call 911 for any worrisome signs/sx. Kimi

## 2023-04-09 NOTE — OB PROVIDER H&P - ATTENDING COMMENTS
32y  at 39.0 weeks GA who presents to L&D for persistent decreased fetal movement with reassuring fetal testing, admitted for induction of labor. Consent for care signed after questions answered.

## 2023-04-10 ENCOUNTER — TRANSCRIPTION ENCOUNTER (OUTPATIENT)
Age: 33
End: 2023-04-10

## 2023-04-10 LAB
ALBUMIN SERPL ELPH-MCNC: 3.5 G/DL — SIGNIFICANT CHANGE UP (ref 3.3–5.2)
ALP SERPL-CCNC: 111 U/L — SIGNIFICANT CHANGE UP (ref 40–120)
ALT FLD-CCNC: 13 U/L — SIGNIFICANT CHANGE UP
ANION GAP SERPL CALC-SCNC: 14 MMOL/L — SIGNIFICANT CHANGE UP (ref 5–17)
ANISOCYTOSIS BLD QL: SLIGHT — SIGNIFICANT CHANGE UP
APPEARANCE UR: CLEAR — SIGNIFICANT CHANGE UP
APTT BLD: 24.6 SEC — LOW (ref 27.5–35.5)
AST SERPL-CCNC: 19 U/L — SIGNIFICANT CHANGE UP
BASOPHILS # BLD AUTO: 0.06 K/UL — SIGNIFICANT CHANGE UP (ref 0–0.2)
BASOPHILS NFR BLD AUTO: 0.4 % — SIGNIFICANT CHANGE UP (ref 0–2)
BILIRUB SERPL-MCNC: <0.2 MG/DL — LOW (ref 0.4–2)
BILIRUB UR-MCNC: NEGATIVE — SIGNIFICANT CHANGE UP
BLD GP AB SCN SERPL QL: SIGNIFICANT CHANGE UP
BUN SERPL-MCNC: 9.5 MG/DL — SIGNIFICANT CHANGE UP (ref 8–20)
BURR CELLS BLD QL SMEAR: PRESENT — SIGNIFICANT CHANGE UP
CALCIUM SERPL-MCNC: 9.3 MG/DL — SIGNIFICANT CHANGE UP (ref 8.4–10.5)
CHLORIDE SERPL-SCNC: 102 MMOL/L — SIGNIFICANT CHANGE UP (ref 96–108)
CO2 SERPL-SCNC: 21 MMOL/L — LOW (ref 22–29)
COLOR SPEC: YELLOW — SIGNIFICANT CHANGE UP
CREAT ?TM UR-MCNC: 45 MG/DL — SIGNIFICANT CHANGE UP
CREAT SERPL-MCNC: 0.34 MG/DL — LOW (ref 0.5–1.3)
DIFF PNL FLD: NEGATIVE — SIGNIFICANT CHANGE UP
EGFR: 142 ML/MIN/1.73M2 — SIGNIFICANT CHANGE UP
EOSINOPHIL # BLD AUTO: 0.26 K/UL — SIGNIFICANT CHANGE UP (ref 0–0.5)
EOSINOPHIL NFR BLD AUTO: 1.9 % — SIGNIFICANT CHANGE UP (ref 0–6)
FIBRINOGEN PPP-MCNC: 534 MG/DL — HIGH (ref 200–450)
GLUCOSE SERPL-MCNC: 63 MG/DL — LOW (ref 70–99)
GLUCOSE UR QL: NEGATIVE MG/DL — SIGNIFICANT CHANGE UP
HCT VFR BLD CALC: 37.6 % — SIGNIFICANT CHANGE UP (ref 34.5–45)
HGB BLD-MCNC: 12.5 G/DL — SIGNIFICANT CHANGE UP (ref 11.5–15.5)
IMM GRANULOCYTES NFR BLD AUTO: 1.3 % — HIGH (ref 0–0.9)
INR BLD: 0.99 RATIO — SIGNIFICANT CHANGE UP (ref 0.88–1.16)
KETONES UR-MCNC: NEGATIVE — SIGNIFICANT CHANGE UP
LDH SERPL L TO P-CCNC: 224 U/L — HIGH (ref 98–192)
LEUKOCYTE ESTERASE UR-ACNC: NEGATIVE — SIGNIFICANT CHANGE UP
LYMPHOCYTES # BLD AUTO: 22.8 % — SIGNIFICANT CHANGE UP (ref 13–44)
LYMPHOCYTES # BLD AUTO: 3.08 K/UL — SIGNIFICANT CHANGE UP (ref 1–3.3)
MANUAL SMEAR VERIFICATION: SIGNIFICANT CHANGE UP
MCHC RBC-ENTMCNC: 26.5 PG — LOW (ref 27–34)
MCHC RBC-ENTMCNC: 33.2 GM/DL — SIGNIFICANT CHANGE UP (ref 32–36)
MCV RBC AUTO: 79.7 FL — LOW (ref 80–100)
MICROCYTES BLD QL: SLIGHT — SIGNIFICANT CHANGE UP
MONOCYTES # BLD AUTO: 1.19 K/UL — HIGH (ref 0–0.9)
MONOCYTES NFR BLD AUTO: 8.8 % — SIGNIFICANT CHANGE UP (ref 2–14)
NEUTROPHILS # BLD AUTO: 8.77 K/UL — HIGH (ref 1.8–7.4)
NEUTROPHILS NFR BLD AUTO: 64.8 % — SIGNIFICANT CHANGE UP (ref 43–77)
NITRITE UR-MCNC: NEGATIVE — SIGNIFICANT CHANGE UP
PH UR: 7 — SIGNIFICANT CHANGE UP (ref 5–8)
PLAT MORPH BLD: NORMAL — SIGNIFICANT CHANGE UP
PLATELET # BLD AUTO: 248 K/UL — SIGNIFICANT CHANGE UP (ref 150–400)
POIKILOCYTOSIS BLD QL AUTO: SLIGHT — SIGNIFICANT CHANGE UP
POLYCHROMASIA BLD QL SMEAR: SLIGHT — SIGNIFICANT CHANGE UP
POTASSIUM SERPL-MCNC: 4 MMOL/L — SIGNIFICANT CHANGE UP (ref 3.5–5.3)
POTASSIUM SERPL-SCNC: 4 MMOL/L — SIGNIFICANT CHANGE UP (ref 3.5–5.3)
PROT ?TM UR-MCNC: 8 MG/DL — SIGNIFICANT CHANGE UP (ref 0–12)
PROT ?TM UR-MCNC: 8 MG/DL — SIGNIFICANT CHANGE UP (ref 0–12)
PROT SERPL-MCNC: 6.8 G/DL — SIGNIFICANT CHANGE UP (ref 6.6–8.7)
PROT UR-MCNC: NEGATIVE — SIGNIFICANT CHANGE UP
PROT/CREAT UR-RTO: 0.2 RATIO — SIGNIFICANT CHANGE UP
PROTHROM AB SERPL-ACNC: 11.5 SEC — SIGNIFICANT CHANGE UP (ref 10.5–13.4)
RBC # BLD: 4.72 M/UL — SIGNIFICANT CHANGE UP (ref 3.8–5.2)
RBC # FLD: 15.5 % — HIGH (ref 10.3–14.5)
RBC BLD AUTO: ABNORMAL
RPR SERPL-ACNC: SIGNIFICANT CHANGE UP
SARS-COV-2 RNA SPEC QL NAA+PROBE: SIGNIFICANT CHANGE UP
SODIUM SERPL-SCNC: 137 MMOL/L — SIGNIFICANT CHANGE UP (ref 135–145)
SP GR SPEC: 1.01 — SIGNIFICANT CHANGE UP (ref 1.01–1.02)
T PALLIDUM AB TITR SER: POSITIVE
T PALLIDUM IGG SER QL IF: SIGNIFICANT CHANGE UP
URATE SERPL-MCNC: 4.8 MG/DL — SIGNIFICANT CHANGE UP (ref 2.4–5.7)
UROBILINOGEN FLD QL: NEGATIVE MG/DL — SIGNIFICANT CHANGE UP
WBC # BLD: 13.53 K/UL — HIGH (ref 3.8–10.5)
WBC # FLD AUTO: 13.53 K/UL — HIGH (ref 3.8–10.5)

## 2023-04-10 PROCEDURE — 59410 OBSTETRICAL CARE: CPT | Mod: U9

## 2023-04-10 RX ORDER — OXYTOCIN 10 UNIT/ML
VIAL (ML) INJECTION
Qty: 30 | Refills: 0 | Status: DISCONTINUED | OUTPATIENT
Start: 2023-04-10 | End: 2023-04-10

## 2023-04-10 RX ORDER — SODIUM CHLORIDE 9 MG/ML
1000 INJECTION, SOLUTION INTRAVENOUS ONCE
Refills: 0 | Status: COMPLETED | OUTPATIENT
Start: 2023-04-10 | End: 2023-04-10

## 2023-04-10 RX ORDER — OXYTOCIN 10 UNIT/ML
VIAL (ML) INJECTION
Qty: 30 | Refills: 0 | Status: DISCONTINUED | OUTPATIENT
Start: 2023-04-10 | End: 2023-04-11

## 2023-04-10 RX ORDER — OXYTOCIN 10 UNIT/ML
VIAL (ML) INJECTION
Qty: 30 | Refills: 0 | Status: DISCONTINUED | OUTPATIENT
Start: 2023-04-10 | End: 2023-04-12

## 2023-04-10 RX ADMIN — SODIUM CHLORIDE 125 MILLILITER(S): 9 INJECTION, SOLUTION INTRAVENOUS at 21:50

## 2023-04-10 RX ADMIN — SODIUM CHLORIDE 125 MILLILITER(S): 9 INJECTION, SOLUTION INTRAVENOUS at 08:00

## 2023-04-10 RX ADMIN — Medication 108 GRAM(S): at 16:30

## 2023-04-10 RX ADMIN — Medication 2 MILLIUNIT(S)/MIN: at 22:21

## 2023-04-10 RX ADMIN — Medication 108 GRAM(S): at 08:22

## 2023-04-10 RX ADMIN — SODIUM CHLORIDE 125 MILLILITER(S): 9 INJECTION, SOLUTION INTRAVENOUS at 14:00

## 2023-04-10 RX ADMIN — Medication 200 GRAM(S): at 00:33

## 2023-04-10 RX ADMIN — SODIUM CHLORIDE 125 MILLILITER(S): 9 INJECTION, SOLUTION INTRAVENOUS at 00:33

## 2023-04-10 RX ADMIN — Medication 108 GRAM(S): at 20:25

## 2023-04-10 RX ADMIN — Medication 2 MILLIUNIT(S)/MIN: at 08:20

## 2023-04-10 RX ADMIN — SODIUM CHLORIDE 125 MILLILITER(S): 9 INJECTION, SOLUTION INTRAVENOUS at 19:00

## 2023-04-10 RX ADMIN — Medication 2 MILLIUNIT(S)/MIN: at 04:24

## 2023-04-10 RX ADMIN — Medication 108 GRAM(S): at 12:30

## 2023-04-10 RX ADMIN — SODIUM CHLORIDE 1000 MILLILITER(S): 9 INJECTION, SOLUTION INTRAVENOUS at 13:20

## 2023-04-10 RX ADMIN — Medication 108 GRAM(S): at 04:22

## 2023-04-10 NOTE — OB PROVIDER LABOR PROGRESS NOTE - NS_OBIHIFHRDETAILS_OBGYN_ALL_OB_FT
145BPM baseline, moderate variability, + accelerations, no decels
130bpm mod variability +accels -decels
120bpm mod variability +accels -decels
120bpm mod variability +accels -decels
135bpm baseline, moderate variability, + accelerations, 5m prolonged deceleration resolved with left lateral positioning
baseline 130, moderate variability, + accels, + 6min prolonged deceleration, now recovered

## 2023-04-10 NOTE — OB RN DELIVERY SUMMARY - NSSELHIDDEN_OBGYN_ALL_OB_FT
[NS_DeliveryAttending1_OBGYN_ALL_OB_FT:[id number]],[NS_DeliveryAssist1_OBGYN_ALL_OB_FT:[id number]],[NS_DeliveryRN_OBGYN_ALL_OB_FT:[id number]]

## 2023-04-10 NOTE — OB RN DELIVERY SUMMARY - NS_SEPSISRSKCALC_OBGYN_ALL_OB_FT
EOS calculated successfully. EOS Risk Factor: 0.5/1000 live births ([facility] national incidence); GA=39w1d; Temp=98.8; ROM=12.983; GBS='Positive'; Antibiotics='GBS specific antibiotics > 2 hrs prior to birth'   EOS calculated successfully. EOS Risk Factor: 0.5/1000 live births ([facility] national incidence); GA=39w1d; Temp=99.9; ROM=12.983; GBS='Positive'; Antibiotics='GBS specific antibiotics > 2 hrs prior to birth'

## 2023-04-10 NOTE — OB PROVIDER LABOR PROGRESS NOTE - NS_SUBJECTIVE/OBJECTIVE_OBGYN_ALL_OB_FT
Patient reports mild abdominal cramping pains
Epi in place  Pit off
Patient comfortable
FH showing prolonged deceleration. Patient repositioned with good return to baseline
Patient having a prolonged deceleration

## 2023-04-10 NOTE — OB PROVIDER LABOR PROGRESS NOTE - NSVAGINALEXAM_OBGYN_ALL_OB_DT
10-Apr-2023 01:15
10-Apr-2023 14:20
10-Apr-2023 17:13
10-Apr-2023 19:49
10-Apr-2023 04:25
10-Apr-2023 10:58

## 2023-04-10 NOTE — OB PROVIDER LABOR PROGRESS NOTE - ASSESSMENT
- FHT cat 2  - Baseline now recovered  - Continue expectant management  - Continue to monitor    Discussed with Dr. Malin
IUPC, FSE placed  pitocin on 6mu; increase as tolerated  AROM blood tinged at time of exam
Pitocin was shut off; will resume when baby recovers  
S/p vycto x1  Contractions spaced  For pitocin  AROM next exam 
Cat I tracing  Vcyto #1 placed  Will reexamine at 4555 with likely transition to pitocin  Epidural PRN
Cat I tracing  Repositioned to peanut ball  No change since exam at 1600  Pit d/c'd at 1700 for prolonged decel  Tracing now improved will restart pit

## 2023-04-10 NOTE — OB PROVIDER IHI INDUCTION/AUGMENTATION NOTE - NS_STATION_OBGYN_ALL_OB_NU
-3 V-Y Plasty Text: The defect edges were debeveled with a #15 scalpel blade.  Given the location of the defect, shape of the defect and the proximity to free margins an V-Y advancement flap was deemed most appropriate.  Using a sterile surgical marker, an appropriate advancement flap was drawn incorporating the defect and placing the expected incisions within the relaxed skin tension lines where possible.    The area thus outlined was incised deep to adipose tissue with a #15 scalpel blade.  The skin margins were undermined to an appropriate distance in all directions utilizing iris scissors.

## 2023-04-10 NOTE — OB RN DELIVERY SUMMARY - NS_GENERALBABYACOMMENTA_OBGYN_ALL_OB_FT
Dr. He present for the delivery due to intermittent cat 2 FHR tracing and he was present for shoulder dystocia. As per neonatologist baby girl to be admitted to regular NBN and remain at mother bedside as apgars are 8/9 and x ray to be ordered when pt moves over to post partum unit for baby girl.

## 2023-04-10 NOTE — OB RN DELIVERY SUMMARY - NS_LABORCHARACTER_OBGYN_ALL_OB
Induction of labor-AROM/Augmentation of labor/Internal electronic FM/External electronic FM/Antibiotics in labor

## 2023-04-11 LAB
ALBUMIN SERPL ELPH-MCNC: 2.9 G/DL — LOW (ref 3.3–5.2)
ALP SERPL-CCNC: 80 U/L — SIGNIFICANT CHANGE UP (ref 40–120)
ALT FLD-CCNC: 10 U/L — SIGNIFICANT CHANGE UP
ANION GAP SERPL CALC-SCNC: 11 MMOL/L — SIGNIFICANT CHANGE UP (ref 5–17)
AST SERPL-CCNC: 22 U/L — SIGNIFICANT CHANGE UP
BILIRUB SERPL-MCNC: 0.3 MG/DL — LOW (ref 0.4–2)
BUN SERPL-MCNC: 7 MG/DL — LOW (ref 8–20)
CALCIUM SERPL-MCNC: 9.1 MG/DL — SIGNIFICANT CHANGE UP (ref 8.4–10.5)
CHLORIDE SERPL-SCNC: 105 MMOL/L — SIGNIFICANT CHANGE UP (ref 96–108)
CO2 SERPL-SCNC: 23 MMOL/L — SIGNIFICANT CHANGE UP (ref 22–29)
CREAT SERPL-MCNC: 0.39 MG/DL — LOW (ref 0.5–1.3)
EGFR: 137 ML/MIN/1.73M2 — SIGNIFICANT CHANGE UP
GLUCOSE SERPL-MCNC: 75 MG/DL — SIGNIFICANT CHANGE UP (ref 70–99)
HCT VFR BLD CALC: 32.1 % — LOW (ref 34.5–45)
HGB BLD-MCNC: 10.5 G/DL — LOW (ref 11.5–15.5)
MCHC RBC-ENTMCNC: 26.4 PG — LOW (ref 27–34)
MCHC RBC-ENTMCNC: 32.7 GM/DL — SIGNIFICANT CHANGE UP (ref 32–36)
MCV RBC AUTO: 80.7 FL — SIGNIFICANT CHANGE UP (ref 80–100)
PLATELET # BLD AUTO: 208 K/UL — SIGNIFICANT CHANGE UP (ref 150–400)
POTASSIUM SERPL-MCNC: 4 MMOL/L — SIGNIFICANT CHANGE UP (ref 3.5–5.3)
POTASSIUM SERPL-SCNC: 4 MMOL/L — SIGNIFICANT CHANGE UP (ref 3.5–5.3)
PROT SERPL-MCNC: 5.5 G/DL — LOW (ref 6.6–8.7)
RBC # BLD: 3.98 M/UL — SIGNIFICANT CHANGE UP (ref 3.8–5.2)
RBC # FLD: 15.7 % — HIGH (ref 10.3–14.5)
SODIUM SERPL-SCNC: 139 MMOL/L — SIGNIFICANT CHANGE UP (ref 135–145)
WBC # BLD: 14.73 K/UL — HIGH (ref 3.8–10.5)
WBC # FLD AUTO: 14.73 K/UL — HIGH (ref 3.8–10.5)

## 2023-04-11 RX ORDER — HYDROCORTISONE 1 %
1 OINTMENT (GRAM) TOPICAL EVERY 6 HOURS
Refills: 0 | Status: DISCONTINUED | OUTPATIENT
Start: 2023-04-10 | End: 2023-04-12

## 2023-04-11 RX ORDER — ACETAMINOPHEN 500 MG
975 TABLET ORAL
Refills: 0 | Status: DISCONTINUED | OUTPATIENT
Start: 2023-04-10 | End: 2023-04-12

## 2023-04-11 RX ORDER — DIPHENHYDRAMINE HCL 50 MG
25 CAPSULE ORAL EVERY 6 HOURS
Refills: 0 | Status: DISCONTINUED | OUTPATIENT
Start: 2023-04-10 | End: 2023-04-12

## 2023-04-11 RX ORDER — OXYCODONE HYDROCHLORIDE 5 MG/1
5 TABLET ORAL
Refills: 0 | Status: DISCONTINUED | OUTPATIENT
Start: 2023-04-10 | End: 2023-04-12

## 2023-04-11 RX ORDER — OXYCODONE HYDROCHLORIDE 5 MG/1
5 TABLET ORAL ONCE
Refills: 0 | Status: DISCONTINUED | OUTPATIENT
Start: 2023-04-10 | End: 2023-04-12

## 2023-04-11 RX ORDER — DIBUCAINE 1 %
1 OINTMENT (GRAM) RECTAL EVERY 6 HOURS
Refills: 0 | Status: DISCONTINUED | OUTPATIENT
Start: 2023-04-10 | End: 2023-04-12

## 2023-04-11 RX ORDER — OXYTOCIN 10 UNIT/ML
41.67 VIAL (ML) INJECTION
Qty: 20 | Refills: 0 | Status: DISCONTINUED | OUTPATIENT
Start: 2023-04-10 | End: 2023-04-12

## 2023-04-11 RX ORDER — SODIUM CHLORIDE 9 MG/ML
3 INJECTION INTRAMUSCULAR; INTRAVENOUS; SUBCUTANEOUS EVERY 8 HOURS
Refills: 0 | Status: DISCONTINUED | OUTPATIENT
Start: 2023-04-10 | End: 2023-04-12

## 2023-04-11 RX ORDER — TETANUS TOXOID, REDUCED DIPHTHERIA TOXOID AND ACELLULAR PERTUSSIS VACCINE, ADSORBED 5; 2.5; 8; 8; 2.5 [IU]/.5ML; [IU]/.5ML; UG/.5ML; UG/.5ML; UG/.5ML
0.5 SUSPENSION INTRAMUSCULAR ONCE
Refills: 0 | Status: COMPLETED | OUTPATIENT
Start: 2023-04-10

## 2023-04-11 RX ORDER — PRAMOXINE HYDROCHLORIDE 150 MG/15G
1 AEROSOL, FOAM RECTAL EVERY 4 HOURS
Refills: 0 | Status: DISCONTINUED | OUTPATIENT
Start: 2023-04-10 | End: 2023-04-12

## 2023-04-11 RX ORDER — AER TRAVELER 0.5 G/1
1 SOLUTION RECTAL; TOPICAL EVERY 4 HOURS
Refills: 0 | Status: DISCONTINUED | OUTPATIENT
Start: 2023-04-10 | End: 2023-04-12

## 2023-04-11 RX ORDER — IBUPROFEN 200 MG
600 TABLET ORAL EVERY 6 HOURS
Refills: 0 | Status: DISCONTINUED | OUTPATIENT
Start: 2023-04-11 | End: 2023-04-12

## 2023-04-11 RX ORDER — BENZOCAINE 10 %
1 GEL (GRAM) MUCOUS MEMBRANE EVERY 6 HOURS
Refills: 0 | Status: DISCONTINUED | OUTPATIENT
Start: 2023-04-10 | End: 2023-04-12

## 2023-04-11 RX ORDER — LANOLIN
1 OINTMENT (GRAM) TOPICAL EVERY 6 HOURS
Refills: 0 | Status: DISCONTINUED | OUTPATIENT
Start: 2023-04-11 | End: 2023-04-12

## 2023-04-11 RX ORDER — MAGNESIUM HYDROXIDE 400 MG/1
30 TABLET, CHEWABLE ORAL
Refills: 0 | Status: DISCONTINUED | OUTPATIENT
Start: 2023-04-10 | End: 2023-04-12

## 2023-04-11 RX ORDER — IBUPROFEN 200 MG
600 TABLET ORAL EVERY 6 HOURS
Refills: 0 | Status: COMPLETED | OUTPATIENT
Start: 2023-04-10 | End: 2024-03-08

## 2023-04-11 RX ORDER — SIMETHICONE 80 MG/1
80 TABLET, CHEWABLE ORAL EVERY 4 HOURS
Refills: 0 | Status: DISCONTINUED | OUTPATIENT
Start: 2023-04-10 | End: 2023-04-12

## 2023-04-11 RX ORDER — KETOROLAC TROMETHAMINE 30 MG/ML
30 SYRINGE (ML) INJECTION ONCE
Refills: 0 | Status: DISCONTINUED | OUTPATIENT
Start: 2023-04-10 | End: 2023-04-11

## 2023-04-11 RX ADMIN — Medication 1000 MILLIUNIT(S)/MIN: at 00:52

## 2023-04-11 RX ADMIN — Medication 30 MILLIGRAM(S): at 00:52

## 2023-04-11 NOTE — OB NEONATOLOGY/PEDIATRICIAN DELIVERY SUMMARY - NSPEDSNEONOTESA_OBGYN_ALL_OB_FT
BABY MAUREEN is a 39.1 wk 3960g LGA  born at 2349 on [birth year] via  complicated with direct OP presentation, nuchal cord x 1 and transverse with mild shoulder dystocia to 33 yo G P A+/GBS+/RPR NR/HIV neg/ Rubella non-immune// HepBSAg neg mom with adequate PNC.  No h/o DM or HTN.  L&D: Presented to L&D for decreased fetal movement. BPP 6/8.  AROM at 1052, 13 h PTD; clear AF; afebrile.  Mom pretreated x multiple doses of Ampicillin for GBS+  NRFHTs/ Cat II tracings towards end of labor.  Upon delivery transverse with mild shoulder; direct OP, CAN x 1.  Tone slightly reduced upon delivery.  Cried within first minute with suctioning, drying and stimulation.  AS 8/9.  Able to move both upper extremities freely; no crepitations over clavicles.  A/P: Term LGA  delivered vaginally. R shoulder dystocia [ moving both upper extremities freely; no crepitations ]. Direct OP. CAN x 1  Observe for respiratory distress.  Glucose monitoring as per guidelines  Monitor ROM of both upper extremities closely.  Xray of chest to include both clavicles and humerus.  Further care via PMD/ Hospitalist.  Jc He MD

## 2023-04-11 NOTE — OB PROVIDER DELIVERY SUMMARY - NSSELHIDDEN_OBGYN_ALL_OB_FT
[NS_DeliveryAttending1_OBGYN_ALL_OB_FT:[id number]],[NS_DeliveryAssist1_OBGYN_ALL_OB_FT:[id number]],[NS_DeliveryRN_OBGYN_ALL_OB_FT:[id number]]

## 2023-04-11 NOTE — DISCHARGE NOTE OB - PATIENT PORTAL LINK FT
You can access the FollowMyHealth Patient Portal offered by [facility] by registering at the following website: [email or web address]. By joining [employer]’s FollowMyHealth portal, you will also be able to view your health information using other applications (apps) compatible with our system.

## 2023-04-11 NOTE — PROGRESS NOTE ADULT - SUBJECTIVE AND OBJECTIVE BOX
Davina Contreras is a 31y/o woman  day 1 s/p induced vaginal delivery at 39w1d gestation in the setting of decreased fetal movement.     Subjective: Patient seen and examined at bedside. No acute events overnight. Pain well controlled with current pain regimen. She is ambulating well and tolerating PO diet/fluids. She reports normal postpartum bleeding, having used 1 pads over the last 6 hrs. She is voiding well and reports flatus. No BM yet. Reports breastfeeding without difficulties. Patient endorses mild discomfort with urination, which she describes as "external" and which she believes is resulting from irritation of the area surrounding the urethra.  Denies fever, headache, changes in vision, chest pain, SOB, nausea, vomiting. Otherwise, patient feels well without additional complaints.     Objective:    Physical Exam:    Vital Signs Last 24 Hrs  T(C): 36.9 (2023 02:30), Max: 37.7 (2023 00:06)  T(F): 98.4 (2023 02:30), Max: 99.9 (2023 00:06)  HR: 85 (2023 02:30) (59 - 154)  BP: 124/78 (2023 02:30) (89/51 - 155/72)  BP(mean): --  RR: 16 (2023 02:30) (15 - 20)  SpO2: 95% (2023 02:30) (95% - 100%)    Parameters below as of 2023 01:13  Patient On (Oxygen Delivery Method): room air    Gen: NAD  Abdomen: Soft, nontender, no distension, firm uterine fundus at umbilicus  Pelvic: Normal lochia noted  Ext: No calf tenderness bilaterally    LABS:                        12.5   13.53 )-----------( 248      ( 2023 23:38 )             37.6     Allergies    No Known Allergies    Intolerances      MEDICATIONS  (STANDING):  acetaminophen     Tablet .. 975 milliGRAM(s) Oral <User Schedule>  diphtheria/tetanus/pertussis (acellular) Vaccine (Adacel) 0.5 milliLiter(s) IntraMuscular once  ibuprofen  Tablet. 600 milliGRAM(s) Oral every 6 hours  measles/mumps/rubella Vaccine 0.5 milliLiter(s) SubCutaneous once  oxytocin Infusion 41.667 milliUNIT(s)/Min (125 mL/Hr) IV Continuous <Continuous>  oxytocin Infusion.  milliUNIT(s)/Min (2 mL/Hr) IV Continuous <Continuous>  prenatal multivitamin 1 Tablet(s) Oral daily  sodium chloride 0.9% lock flush 3 milliLiter(s) IV Push every 8 hours    MEDICATIONS  (PRN):  benzocaine 20%/menthol 0.5% Spray 1 Spray(s) Topical every 6 hours PRN for Perineal discomfort  dibucaine 1% Ointment 1 Application(s) Topical every 6 hours PRN Perineal discomfort  diphenhydrAMINE 25 milliGRAM(s) Oral every 6 hours PRN Pruritus  hydrocortisone 1% Cream 1 Application(s) Topical every 6 hours PRN Moderate Pain (4-6)  lanolin Ointment 1 Application(s) Topical every 6 hours PRN nipple soreness  magnesium hydroxide Suspension 30 milliLiter(s) Oral two times a day PRN Constipation  oxyCODONE    IR 5 milliGRAM(s) Oral every 3 hours PRN Moderate to Severe Pain (4-10)  oxyCODONE    IR 5 milliGRAM(s) Oral once PRN Moderate to Severe Pain (4-10)  pramoxine 1%/zinc 5% Cream 1 Application(s) Topical every 4 hours PRN Moderate Pain (4-6)  simethicone 80 milliGRAM(s) Chew every 4 hours PRN Gas  witch hazel Pads 1 Application(s) Topical every 4 hours PRN Perineal discomfort           Davina Contreras is a 33y/o woman  day 1 s/p induced vaginal delivery at 39w1d gestation in the setting of decreased fetal movement.     Subjective: Patient seen and examined at bedside. No acute events overnight. Pain well controlled with current pain regimen. She is ambulating well and tolerating PO diet/fluids. She reports normal postpartum bleeding, having used 1 pads over the last 6 hrs. She is voiding well and reports flatus. No BM yet. Reports breastfeeding without difficulties. Patient endorses mild discomfort with urination, which she describes as "external" and which she believes is resulting from irritation of the area surrounding the urethra.  Denies fever, headache, changes in vision, chest pain, SOB, nausea, vomiting. Otherwise, patient feels well without additional complaints.     Objective:    Physical Exam:    Vital Signs Last 24 Hrs  T(C): 36.9 (2023 02:30), Max: 37.7 (2023 00:06)  T(F): 98.4 (2023 02:30), Max: 99.9 (2023 00:06)  HR: 85 (2023 02:30) (59 - 154)  BP: 124/78 (2023 02:30) (89/51 - 155/72)  RR: 16 (2023 02:30) (15 - 20)  SpO2: 95% (2023 02:30) (95% - 100%)    Parameters below as of 2023 01:13  Patient On (Oxygen Delivery Method): room air    Gen: NAD  Abdomen: Soft, nontender, no distension, firm uterine fundus at umbilicus  Pelvic: Normal lochia noted  Ext: No calf tenderness bilaterally    LABS:                        12.5   13.53 )-----------( 248      ( 2023 23:38 )             37.6     Allergies    No Known Allergies    Intolerances      MEDICATIONS  (STANDING):  acetaminophen     Tablet .. 975 milliGRAM(s) Oral <User Schedule>  diphtheria/tetanus/pertussis (acellular) Vaccine (Adacel) 0.5 milliLiter(s) IntraMuscular once  ibuprofen  Tablet. 600 milliGRAM(s) Oral every 6 hours  measles/mumps/rubella Vaccine 0.5 milliLiter(s) SubCutaneous once  oxytocin Infusion 41.667 milliUNIT(s)/Min (125 mL/Hr) IV Continuous <Continuous>  oxytocin Infusion.  milliUNIT(s)/Min (2 mL/Hr) IV Continuous <Continuous>  prenatal multivitamin 1 Tablet(s) Oral daily  sodium chloride 0.9% lock flush 3 milliLiter(s) IV Push every 8 hours    MEDICATIONS  (PRN):  benzocaine 20%/menthol 0.5% Spray 1 Spray(s) Topical every 6 hours PRN for Perineal discomfort  dibucaine 1% Ointment 1 Application(s) Topical every 6 hours PRN Perineal discomfort  diphenhydrAMINE 25 milliGRAM(s) Oral every 6 hours PRN Pruritus  hydrocortisone 1% Cream 1 Application(s) Topical every 6 hours PRN Moderate Pain (4-6)  lanolin Ointment 1 Application(s) Topical every 6 hours PRN nipple soreness  magnesium hydroxide Suspension 30 milliLiter(s) Oral two times a day PRN Constipation  oxyCODONE    IR 5 milliGRAM(s) Oral every 3 hours PRN Moderate to Severe Pain (4-10)  oxyCODONE    IR 5 milliGRAM(s) Oral once PRN Moderate to Severe Pain (4-10)  pramoxine 1%/zinc 5% Cream 1 Application(s) Topical every 4 hours PRN Moderate Pain (4-6)  simethicone 80 milliGRAM(s) Chew every 4 hours PRN Gas  witch hazel Pads 1 Application(s) Topical every 4 hours PRN Perineal discomfort

## 2023-04-11 NOTE — PROGRESS NOTE ADULT - ASSESSMENT
Assessment and Plan    - s/p  PPD.  - Routine post-partum care.  - Pain well controlled, continue current pain regimen.  - Encouraged ambulation.  - Encouraged PO diet/fluids.  - Encouraged breastfeeding. Assessment and Plan    Davina Contreras is a 33y/o woman  day 1 s/p induced vaginal delivery at 39w1d gestation in the setting of decreased fetal movement.   complicated by new diagnosis of gestational hypertension  delivery remarkable for code shoulder with delivery of body <1 minute    Hgb 12.5 > AM CBC pending  Rubella nonimmune - MMR pending  Rh +    #gestational hypertension  -PEC labs at baseline were within normal limits  -continues asymptomatic   -after delivery, BPs normotensive overnight  -continue to monitor    - s/p  PPD.  - Routine post-partum care.  - Pain well controlled, continue current pain regimen.  - Encouraged ambulation.  - Encouraged PO diet/fluids.  - Encouraged breastfeeding.  - Dispo: late delivery, continue inpatient care, anticipate discharge likely PPD#2

## 2023-04-11 NOTE — OB PROVIDER DELIVERY SUMMARY - NS_LABORONSET_OBGYN_ALL_OB_DT
10-Apr-2023 10:52 Azathioprine Counseling:  I discussed with the patient the risks of azathioprine including but not limited to myelosuppression, immunosuppression, hepatotoxicity, lymphoma, and infections.  The patient understands that monitoring is required including baseline LFTs, Creatinine, possible TPMP genotyping and weekly CBCs for the first month and then every 2 weeks thereafter.  The patient verbalized understanding of the proper use and possible adverse effects of azathioprine.  All of the patient's questions and concerns were addressed.

## 2023-04-11 NOTE — OB PROVIDER DELIVERY SUMMARY - NSPROVIDERDELIVERYNOTE_OBGYN_ALL_OB_FT
at 2349 of a live female , 3960 gm and Apgars 8/9. Delivered OP , nuchal cord x1, clear fluid. Infant's head delivered with maternal expulsive efforts. Shoulders unable to deliver; code shoulder called, Sixto maneuver and Duenas corkscrew maneuvers performed with successful delivery of anterior then posterior shoulder. Nose and mouth were bulb suctioned. Cord clamped and cut without delay. Infant brought to [facility] for  care. Placenta delivered spontaneously at 2353 , intact. Fundus firm on bimanual massage with minimal bleeding. Perineum, cervix and vagina were inspected and no lacerations were noted. EBL 200cc. Hemostasis noted. Patient stable and recovering in L&D.  at 2349 of a live female , 3960 gm and Apgars 8/9. Delivered OP , nuchal cord x1, clear fluid. Infant's head delivered with maternal expulsive efforts. Shoulders unable to deliver; code shoulder called, Sixto maneuver performed, shoulders noted to be in transverse position, Duenas corkscrew maneuvers performed with successful delivery of posterior shoulder followed by delivery of anterior shoulder and body. Nose and mouth were bulb suctioned. Cord clamped and cut without delay. Infant brought to [facility] for  care. Placenta delivered spontaneously at 2353 , intact. Fundus firm on bimanual massage with minimal bleeding. Perineum, cervix and vagina were inspected and no lacerations were noted. EBL 200cc. Hemostasis noted. Patient stable and recovering in L&D.

## 2023-04-11 NOTE — ASU PREOP CHECKLIST - WEIGHT IN LBS
[facility]  William Almeida  2010    William Almeida is a 13 y.o. 2 m.o. male presenting for follow-up of s/p PDA device closure, Trisomy 21, and aortic insufficiency.  William is here today with his grandmother.    HPI  William has been followed here since October 2014 with known history of Trisomy 21 and PDA closure (11/1/12, 5-4 Amplatzer ductal occluder). His 2018 echo with dilated aortic annulus. William was last seen here in February 2022 and was reportedly doing well. Exam that day revealed no murmurs, normal EKG. Family was asked to return in 1 year with interim echo; they come now as requested. Since the last visit, William has done well overall with no major illnesses or hospitalizations.       Current Outpatient Medications:     cetirizine (ZYRTEC) 1 mg/mL syrup, Take by mouth once daily., Disp: , Rfl:     cloNIDine 0.1 mg/24 hr td ptwk (CATAPRES) 0.1 mg/24 hr, 1 patch every 7 days., Disp: , Rfl:     fluticasone (FLONASE) 50 mcg/actuation nasal spray, , Disp: , Rfl:     pediatric multivit-iron-min (MULTI-VITAMINS WITH IRON) Chew, once a day, Disp: , Rfl:     Allergies: Review of patient's allergies indicates:  No Known Allergies    The patient's family history includes Diabetes in his paternal grandfather; Hypertension in his paternal grandfather; No Known Problems in his father, maternal grandfather, maternal grandmother, mother, paternal grandmother, and sister.    William Almeida  has a past medical history of Aortic insufficiency, Dilatation of annulus of aortic valve, Down syndrome, and PDA (patent ductus arteriosus).     Past Surgical History:   Procedure Laterality Date    CARDIAC CATHETERIZATION  2012    [facility]: PDA s/p device closure with 5-4 Amplatzer ductal occluder      Birth History    Birth     Weight: 3.175 kg (7 lb)    Gestation Age: 38 wks     Social History     Social History Narrative    He lives with his paternal GM who adopted he and sister  "in 2017. He is in 5th grade at [employer] with speech, physical and occupational therapy once a week. Appetite is good. He is not very active, more so in the summer when he can swim; however he does participate in PE at school.        Review of Systems   Constitutional:  Negative for activity change, appetite change and fatigue.   Respiratory:  Negative for shortness of breath, wheezing and stridor.    Cardiovascular:  Negative for chest pain and palpitations.   Gastrointestinal:  Positive for constipation.   Genitourinary: Negative.    Musculoskeletal:  Negative for gait problem.   Skin:  Negative for color change and rash.   Neurological:  Positive for headaches (with allergy / congestion). Negative for dizziness, seizures, syncope and weakness.   Hematological:  Does not bruise/bleed easily.   Psychiatric/Behavioral:  Negative for sleep disturbance.      Objective:   Vitals:    04/11/23 1233   BP: 110/68   BP Location: Right arm   Patient Position: Sitting   BP Method: Large (Manual)   Pulse: (!) 107   Resp: 18   SpO2: 98%   Weight: 62.9 kg (138 lb 9 oz)   Height: 5' 0.63" (1.54 m)       Physical Exam  Vitals and nursing note reviewed.   Constitutional:       General: He is awake and active. He is not in acute distress.     Appearance: He is well-developed, well-groomed and overweight.      Comments: Phenotype consistent with Down syndrome.   HENT:      Head: Normocephalic.   Cardiovascular:      Rate and Rhythm: Normal rate and regular rhythm.      Pulses: Pulses are strong.           Radial pulses are 2+ on the right side.        Femoral pulses are 2+ on the right side.     Heart sounds: S1 normal and S2 normal. No murmur heard.    No S3 or S4 sounds.      Comments: There are no clicks, rumbles, rubs, lifts, taps, or thrills noted.  Pulmonary:      Effort: Pulmonary effort is normal. No respiratory distress.      Breath sounds: Normal breath sounds and air entry.   Chest:      Chest wall: No deformity.   Abdominal: "      General: Abdomen is flat. Bowel sounds are normal. There is no distension.      Palpations: Abdomen is soft. There is no hepatomegaly or splenomegaly.      Tenderness: There is no abdominal tenderness.      Comments: There are no abdominal bruits noted.   Musculoskeletal:         General: Normal range of motion.      Cervical back: Normal range of motion.      Right lower leg: No edema.      Left lower leg: No edema.   Skin:     General: Skin is warm and dry.      Capillary Refill: Capillary refill takes less than 2 seconds.      Findings: No rash.      Nails: There is no clubbing.   Neurological:      Mental Status: He is alert.   Psychiatric:         Behavior: Behavior normal. Behavior is cooperative.       Tests:   Today's EKG interpretation by Dr. Sanchez reveals: normal sinus rhythm with QRS axis +69 degrees in the frontal plane. There is no atrial enlargement or ventricular hypertrophy noted. R/S in V1 is less than 1.  (Final report in electronic medical record)    Echocardiogram:   Pertinent Echocardiographic findings from the Echo dated 7/5/22 are:   Central AI, trivial to mild  Tricuspid aortic valve  Otherwise normal findings for age  (Full report in electronic medical record)     12/29/16 [facility] echo 3/9/18 [facility] echo 12/15/20 [facility] echo 7/5/22 [facility] echo   LVID 3.1 (z-1.3) 3.2 (z-1.9) 3.8 (z-1.8) 4.3 (z-1.1)   Ao annulus  1.7 (z+2.0) 1.9 (z+0.89) 1.9 (z-0.19)   Ao root 1.6 (z-0.99) 2.2 (z+1.7) 2.2 (z-0.68) 2.4 (z-0.64)   ST junction  1.6 (z-0.14) 1.7 (z-1.5) 1.9 (z-1.2)   Asc Ao  1.8 (z+0.52) 1.8 (z-1.3) 2.1 (z-0.85)   AI -- -- Trivial to mild Trivial to mild       Assessment:  1. Nonrheumatic aortic valve insufficiency    2. Status post catheter-placed plug or coil occlusion of PDA    3. Down syndrome        Discussion:   Dr. Sanchez reviewed history and physical exam. He then performed the physical exam. He discussed the findings with the patient's caregiver(s), and answered all questions.    Aortic valve  regurgitation  Trivial to mild AI noted on echo since 2020; selective IE indicated. History of top normal aortic annulus in 2018, but normal measurements since that time.     Status post catheter-placed plug or coil occlusion of PDA  PDA closed with 5-4 Amplatzer ductal occluder in 2012; no residual PDA by echo. We have discussed the importance of annual follow-up with echo to monitor for device embolization, malposition, or erosion/perforation. We should be notified immediately with complaints of chest pain, sudden dyspnea, or syncope.       I have reviewed our general guidelines related to cardiac issues with the family.  I instructed them in the event of an emergency to call 911 or go to the nearest emergency room.  They know to contact the PCP if problems arise or if they are in doubt.      Plan:    1. Activity:He can participate in normal age-appropriate activities. He should be allowed to set his own pace and rest if fatigued. There are no contraindications for competitive and vigorous activities as tolerated.    2. Selective endocarditis prophylaxis is recommended in this circumstance.     3. Medications:   Current Outpatient Medications   Medication Sig    cetirizine (ZYRTEC) 1 mg/mL syrup Take by mouth once daily.    cloNIDine 0.1 mg/24 hr td ptwk (CATAPRES) 0.1 mg/24 hr 1 patch every 7 days.    fluticasone (FLONASE) 50 mcg/actuation nasal spray     pediatric multivit-iron-min (MULTI-VITAMINS WITH IRON) Chew once a day     No current facility-administered medications for this visit.     4. Orders placed this encounter  Orders Placed This Encounter   Procedures    Pediatric Echo     5. Follow up with the primary care provider for the following issues: Nothing identified.      Follow-Up:   Follow up for clinic f/u and EKG in 1 year; echo this summer.      Sincerely,    Ha Sanchez MD    Note Contributing Authors:  MD Lisa Omer, APRN, CPNP-PC       177.9

## 2023-04-11 NOTE — DISCHARGE NOTE OB - PLAN OF CARE
delivered via spontaneous vaginal delivery. She was transferred to postpartum unit without complications during her stay. Upon discharge she is voiding, tolerating PO, ambulating, and pain is controlled. Follow up with your OB for a blood pressure check in 1 week and bring a blood pressure log. Please buy a blood pressure cuff if you do not have one at home and take your blood pressure twice a day while at home and at rest. Continue any prescribed antihypertensive medication as long as blood pressures are above 100/60. You should call your doctor sooner if you develop changes in vision, headache refractory to pain medication, or upper abdominal pain. Please call sooner if there are any other concerns.

## 2023-04-11 NOTE — DISCHARGE NOTE OB - CARE PLAN
Principal Discharge DX:	 (normal spontaneous vaginal delivery)  Assessment and plan of treatment:	delivered via spontaneous vaginal delivery. She was transferred to postpartum unit without complications during her stay. Upon discharge she is voiding, tolerating PO, ambulating, and pain is controlled.  Secondary Diagnosis:	Gestational hypertension  Assessment and plan of treatment:	Follow up with your OB for a blood pressure check in 1 week and bring a blood pressure log. Please buy a blood pressure cuff if you do not have one at home and take your blood pressure twice a day while at home and at rest. Continue any prescribed antihypertensive medication as long as blood pressures are above 100/60. You should call your doctor sooner if you develop changes in vision, headache refractory to pain medication, or upper abdominal pain. Please call sooner if there are any other concerns.   1

## 2023-04-11 NOTE — OB PROVIDER DELIVERY SUMMARY - NSLOWPPHRISK_OBGYN_A_OB
No previous uterine incision/Aden Pregnancy/Less than or equal to 4 previous vaginal births/No known bleeding disorder/No history of postpartum hemorrhage

## 2023-04-11 NOTE — DISCHARGE NOTE OB - CARE PROVIDER_API CALL
Devin Malin)  OBSGYN  [facility]  [address] [place] [address]  Phone: [phone]  Fax: [phone]  Established Patient  Follow Up Time: 2 weeks

## 2023-04-12 VITALS
HEART RATE: 68 BPM | SYSTOLIC BLOOD PRESSURE: 133 MMHG | RESPIRATION RATE: 18 BRPM | DIASTOLIC BLOOD PRESSURE: 81 MMHG | OXYGEN SATURATION: 97 % | TEMPERATURE: 98 F

## 2023-04-12 PROCEDURE — 80053 COMPREHEN METABOLIC PANEL: CPT

## 2023-04-12 PROCEDURE — U0005: CPT

## 2023-04-12 PROCEDURE — 86901 BLOOD TYPING SEROLOGIC RH(D): CPT

## 2023-04-12 PROCEDURE — 81003 URINALYSIS AUTO W/O SCOPE: CPT

## 2023-04-12 PROCEDURE — 86780 TREPONEMA PALLIDUM: CPT

## 2023-04-12 PROCEDURE — U0003: CPT

## 2023-04-12 PROCEDURE — 82570 ASSAY OF URINE CREATININE: CPT

## 2023-04-12 PROCEDURE — 84550 ASSAY OF BLOOD/URIC ACID: CPT

## 2023-04-12 PROCEDURE — 36415 COLL VENOUS BLD VENIPUNCTURE: CPT

## 2023-04-12 PROCEDURE — 86592 SYPHILIS TEST NON-TREP QUAL: CPT

## 2023-04-12 PROCEDURE — 90715 TDAP VACCINE 7 YRS/> IM: CPT

## 2023-04-12 PROCEDURE — 86850 RBC ANTIBODY SCREEN: CPT

## 2023-04-12 PROCEDURE — 84156 ASSAY OF PROTEIN URINE: CPT

## 2023-04-12 PROCEDURE — 85027 COMPLETE CBC AUTOMATED: CPT

## 2023-04-12 PROCEDURE — 85025 COMPLETE CBC W/AUTO DIFF WBC: CPT

## 2023-04-12 PROCEDURE — 85730 THROMBOPLASTIN TIME PARTIAL: CPT

## 2023-04-12 PROCEDURE — 85610 PROTHROMBIN TIME: CPT

## 2023-04-12 PROCEDURE — 85384 FIBRINOGEN ACTIVITY: CPT

## 2023-04-12 PROCEDURE — 83615 LACTATE (LD) (LDH) ENZYME: CPT

## 2023-04-12 PROCEDURE — 86900 BLOOD TYPING SEROLOGIC ABO: CPT

## 2023-04-12 RX ORDER — IBUPROFEN 200 MG
1 TABLET ORAL
Qty: 0 | Refills: 0 | DISCHARGE
Start: 2023-04-12

## 2023-04-12 RX ORDER — ACETAMINOPHEN 500 MG
3 TABLET ORAL
Qty: 0 | Refills: 0 | DISCHARGE
Start: 2023-04-12

## 2023-04-12 RX ORDER — TETANUS TOXOID, REDUCED DIPHTHERIA TOXOID AND ACELLULAR PERTUSSIS VACCINE, ADSORBED 5; 2.5; 8; 8; 2.5 [IU]/.5ML; [IU]/.5ML; UG/.5ML; UG/.5ML; UG/.5ML
0.5 SUSPENSION INTRAMUSCULAR ONCE
Refills: 0 | Status: COMPLETED | OUTPATIENT
Start: 2023-04-12 | End: 2023-04-12

## 2023-04-12 RX ADMIN — Medication 975 MILLIGRAM(S): at 09:30

## 2023-04-12 RX ADMIN — Medication 975 MILLIGRAM(S): at 08:32

## 2023-04-12 RX ADMIN — Medication 1 TABLET(S): at 11:45

## 2023-04-12 RX ADMIN — TETANUS TOXOID, REDUCED DIPHTHERIA TOXOID AND ACELLULAR PERTUSSIS VACCINE, ADSORBED 0.5 MILLILITER(S): 5; 2.5; 8; 8; 2.5 SUSPENSION INTRAMUSCULAR at 13:51

## 2023-04-12 NOTE — PROGRESS NOTE ADULT - SUBJECTIVE AND OBJECTIVE BOX
Davina Contreras is a 31y/o woman  day 1 s/p normal spontaneous vaginal delivery at 39w1d gestation in the setting of decreased fetal movement, c/b gHTN.     S:    No acute events overnight.   The patient has no complaints.  Pain controlled with current treatment regimen.   She is ambulating without difficulty and tolerating PO.   + flatus/-BM/+ voiding   She endorses appropriate lochia, which is decreasing.   She denies fevers, chills, nausea and vomiting.   She denies lightheadedness, dizziness, palpitations, chest pain and SOB.     O:    T(C): 36.7 (23 @ 05:35), Max: 36.9 (23 @ 15:57)  HR: 68 (23 @ 05:35) (68 - 84)  BP: 133/81 (23 @ 05:35) (127/76 - 133/81)  RR: 18 (23 @ 05:35) (18 - 18)  SpO2: 97% (23 @ 05:35) (97% - 100%)    Gen: NAD, AOx3  Breast: Nontender, non-engorged   Abdomen:  Soft, non-tender, non-distended  Uterus:  Fundus firm below umbilicus  VE:  Expectant lochia  Ext:  Non-tender and non-edematous

## 2023-04-12 NOTE — PROGRESS NOTE ADULT - ATTENDING COMMENTS
Patient seen and examined, agree with above. Recovering well, no complaints, vitals including BP normal today. Clear for d/c home.    John Ponce MD

## 2023-04-12 NOTE — PROGRESS NOTE ADULT - ASSESSMENT
Davina Contreras is a 31y/o woman  day 1 s/p normal spontaneous vaginal delivery at 39w1d gestation in the setting of decreased fetal movement, c/b gHTN.     -Vital signs stable  -Hgb: 12.5>10.5   -Voiding, tolerating PO, bowel function nml   -Advance care as tolerated   -Continue routine postpartum care and education  -Healthy female infant  -Dispo: Pt is stable, doing well and meeting all postpartum and postoperative milestones. Possible discharge to home today pending attending approval.      Will d/w Dr. Ponce   Davina Contreras is a 33y/o woman  day 1 s/p normal spontaneous vaginal delivery at 39w1d gestation in the setting of decreased fetal movement, c/b gHTN.     -Vital signs stable; gHTN BPs normotensive; to be sent with cardio OB follow up  -Hgb: 12.5>10.5   -Voiding, tolerating PO, bowel function nml   -Advance care as tolerated   -Continue routine postpartum care and education  -Healthy female infant  -Dispo: Pt is stable, doing well and meeting all postpartum and postoperative milestones. Possible discharge to home today pending attending approval.      Will d/w Dr. Ponce   Davian Contreras is a 33y/o woman  day 1 s/p normal spontaneous vaginal delivery at 39w1d gestation in the setting of decreased fetal movement, c/b gHTN.     -Vital signs stable; gHTN BPs normotensive; to be sent with cardio OB follow up  -Hgb: 12.5>10.5   -Voiding, tolerating PO, bowel function nml   -Advance care as tolerated   -Continue routine postpartum care and education  -Healthy female infant  -Dispo: Pt is stable, doing well and meeting all postpartum and postoperative milestones. Possible discharge to home today pending attending approval.

## 2023-04-12 NOTE — PROGRESS NOTE ADULT - SUBJECTIVE AND OBJECTIVE BOX
INTERVAL HPI/OVERNIGHT EVENTS:  32y Female s/p labor epidural on 4/10/23    Vital Signs Last 24 Hrs  T(C): 36.7 (12 Apr 2023 05:35), Max: 36.9 (11 Apr 2023 15:57)  T(F): 98.1 (12 Apr 2023 05:35), Max: 98.4 (11 Apr 2023 15:57)  HR: 68 (12 Apr 2023 05:35) (68 - 84)  BP: 133/81 (12 Apr 2023 05:35) (127/76 - 133/81)  BP(mean): --  RR: 18 (12 Apr 2023 05:35) (18 - 18)  SpO2: 97% (12 Apr 2023 05:35) (97% - 100%)    Parameters below as of 12 Apr 2023 05:35  Patient On (Oxygen Delivery Method): room air            Patient satisfied    Patient seen and doing well     No headache      No residual numbness or weakness, sensory and motor function intact    Site not examined    No anesthetic complications or complaints noted or reported

## 2023-04-14 ENCOUNTER — APPOINTMENT (OUTPATIENT)
Dept: OBGYN | Facility: CLINIC | Age: 33
End: 2023-04-14

## 2023-04-14 ENCOUNTER — APPOINTMENT (OUTPATIENT)
Dept: ANTEPARTUM | Facility: CLINIC | Age: 33
End: 2023-04-14

## 2023-04-26 ENCOUNTER — APPOINTMENT (OUTPATIENT)
Dept: OBGYN | Facility: CLINIC | Age: 33
End: 2023-04-26
Payer: COMMERCIAL

## 2023-04-26 VITALS
HEIGHT: 66 IN | DIASTOLIC BLOOD PRESSURE: 70 MMHG | BODY MASS INDEX: 36 KG/M2 | WEIGHT: 224 LBS | SYSTOLIC BLOOD PRESSURE: 114 MMHG

## 2023-04-26 DIAGNOSIS — Z13.31 ENCOUNTER FOR SCREENING FOR DEPRESSION: ICD-10-CM

## 2023-04-26 DIAGNOSIS — Z34.93 ENCOUNTER FOR SUPERVISION OF NORMAL PREGNANCY, UNSPECIFIED, THIRD TRIMESTER: ICD-10-CM

## 2023-04-26 DIAGNOSIS — B95.1 STREPTOCOCCUS, GROUP B, AS THE CAUSE OF DISEASES CLASSIFIED ELSEWHERE: ICD-10-CM

## 2023-04-26 PROCEDURE — 0503F POSTPARTUM CARE VISIT: CPT

## 2023-04-26 PROCEDURE — 99212 OFFICE O/P EST SF 10 MIN: CPT

## 2023-04-26 NOTE — HISTORY OF PRESENT ILLNESS
[Last Pap Date: ___] : Last Pap Date: [unfilled] [Delivery Date: ___] : on [unfilled] [] : delivered by vaginal delivery [Female] : Delivery History: baby girl [Wt. ___] : weighing [unfilled] [Breastfeeding] : currently nursing [Complications:___] : complications include: [unfilled] [Discharge HGB: ___] : hemoglobin level was [unfilled] [Resumed Menses] : has not resumed her menses [Resumed [place]] : has not resumed intercourse [Intended Contraception] : the patient does not intended to use contraception postpartum [None] : No associated symptoms are reported [de-identified] : fundus firm NT [FreeTextEntry1] : HPI:\par  31 y/o s/p  2 weeks ago\par Baby doing well and has seen peds\par Feels short tempered w/ partner only; does not really want him around even though he is helpful\par Breast/bottle feeding w/o issue\par \par LMP: 22\par Last pap 2021 neg, neg HPV\par \par Labs: \par A positive. RNI (refused MMR per note) VI. GCT?\par \par DELIVERY:\par 4/10/23 , shoulder dystocia.  cc. apgars 8 &  9. Weight 8lb 11oz\par \par Discharge hg 10.5\par \par ---------------------------------------------------------------------------------------------------------\par ASSESSMENT & PLAN:\par \par 31 y/o \par #s/p \par #MMR NI\par -EPDS 9; denies SI/HI; recommend therapy; card given; doesn't feel she needs meds\par -con't pelvic rest and breast feeding\par -discussed importance of MMR vaccine to prevent issues in future pregnancies; patient to consider\par -recommend PNV or MV\par \par rto 4 wk PP visit\par

## 2023-05-12 ENCOUNTER — APPOINTMENT (OUTPATIENT)
Dept: OBGYN | Facility: CLINIC | Age: 33
End: 2023-05-12
Payer: COMMERCIAL

## 2023-05-12 VITALS
WEIGHT: 227 LBS | DIASTOLIC BLOOD PRESSURE: 64 MMHG | SYSTOLIC BLOOD PRESSURE: 102 MMHG | BODY MASS INDEX: 37.82 KG/M2 | HEIGHT: 65 IN

## 2023-05-12 PROCEDURE — 0503F POSTPARTUM CARE VISIT: CPT

## 2023-05-12 NOTE — HISTORY OF PRESENT ILLNESS
[N] : Patient is not sexually active [Y] : Positive pregnancy history [Previously active] : previously active [Papeardate] : 09/21/21 [TextBox_31] : NEG [GonorrheaDate] : 03/07/23 [TextBox_63] : NEG [ChlamydiaDate] : 03/07/23 [TextBox_68] : NEG [HPVDate] : 09/21/21 [TextBox_78] : NEG [LMPDate] : 07/16/22 [PGHxTotal] : 2 [[facility]] : 2 [[facility]] : 2 [FreeTextEntry1] : 07/16/22

## 2023-05-12 NOTE — DISCUSSION/SUMMARY
[FreeTextEntry1] : 33 y/o now  s/p  at 39w1d on .\par - [place] screen negative, mood appropriate, feels well taking care of baby\par - discussed continuing prenatal vitamins \par - discussed continued BF for 6 months, exclusively if possible (benefits explained) \par - discussed adequate diet and physical activity\par - last pap: Sep 2021, NILM HPV NEG \par \par #Contraception \par - discussed contraceptive options including OCP's, DMPA, and implants (Nexplanon, IUD) \par - discussed mechanism of action, administration routes, known risks/benefits, efficacy rates, and immediate/delayed side effect profiles of each \par - she verbalized understanding and desire for DMPA vs Nexplanon, thinking about it \par \par She verbalized understanding and agreement with above counseling regarding differential diagnosis, evaluation, and plan. \par She was given time for questions/concerns which were all answered to her apparent satisfaction.\par All designated lab work for today drawn in office. \par \par RTO 1 month for GYN ANNUAL and contraception management

## 2023-05-12 NOTE — REASON FOR VISIT
[Follow-Up] : a follow-up evaluation of [FreeTextEntry2] : PPA-   ON 4/10/23 FEMALE 8lbs 11oz. (PT BOTTLE AND BREAST FEEDING)

## 2023-05-22 DIAGNOSIS — R45.89 OTHER SPECIFIED DISEASES AND CONDITIONS COMPLICATING PUERPERIUM: ICD-10-CM

## 2023-06-13 ENCOUNTER — APPOINTMENT (OUTPATIENT)
Dept: OBGYN | Facility: CLINIC | Age: 33
End: 2023-06-13
Payer: COMMERCIAL

## 2023-06-13 VITALS
HEIGHT: 65 IN | BODY MASS INDEX: 38.65 KG/M2 | SYSTOLIC BLOOD PRESSURE: 114 MMHG | WEIGHT: 232 LBS | DIASTOLIC BLOOD PRESSURE: 80 MMHG

## 2023-06-13 DIAGNOSIS — N89.8 OTHER SPECIFIED NONINFLAMMATORY DISORDERS OF VAGINA: ICD-10-CM

## 2023-06-13 PROCEDURE — 99395 PREV VISIT EST AGE 18-39: CPT

## 2023-06-13 NOTE — HISTORY OF PRESENT ILLNESS
[Condoms] : uses condoms [N] : Patient is not sexually active [Y] : Positive pregnancy history [Menarche Age: ____] : age at menarche was [unfilled] [Papeardate] : 09/21/21 [TextBox_31] : wnl [GonorrheaDate] : 03/07/23 [TextBox_63] : neg [ChlamydiaDate] : 03/07/23 [TextBox_68] : neg [HPVDate] : 09/21/21 [TextBox_78] : neg [LMPDate] : 07/16/22 [PGHxTotal] : 2 [[facility]] : 2 [[facility]] : 2 [FreeTextEntry1] : 07/16/22

## 2023-06-13 NOTE — COUNSELING
[Nutrition/ Exercise/ Weight Management] : nutrition, exercise, weight management [Vitamins/Supplements] : vitamins/supplements [Lab Results] : lab results [Contraception/ Emergency Contraception/ Safe Sexual Practices] : contraception, emergency contraception, safe sexual practices

## 2023-06-13 NOTE — DISCUSSION/SUMMARY
[FreeTextEntry1] : Davina is a 33 y/o  LMP 22 who presents for annual exam. \par PCP: does not have one. \par \par #Well Woman Visit \par 1. Nutrition/Activity: The benefits of adequate calcium intake and a daily multivitamin along with routine daily cardiovascular exercise were reviewed with the patient. Continues with PNV.\par 2. Health Screening: She was informed of the benefits of a screening colonoscopy/DEXA/Mammo/Pap.\par - Cervix: We reviewed ASCCP/ACOG guidelines for pap smear screening. Last pap 2022, NILM HPV neg, discussed guidelines, she desires to defer until 3yr interval. \par 3. Sex Health:  The importance of safe-sex practices was discussed with the patient. STD screening was offered to patient, she declines.\par 5. No existing history of essential HTN or DM. Given PCP referral. \par \par \par She verbalized understanding and agreement with above counseling regarding differential diagnosis, evaluation, and plan. \par She was given time for questions/concerns which were all answered to her apparent satisfaction.\par All designated lab work for today drawn in office. \par \par RTO 1yr for next ANNUAL

## 2023-06-13 NOTE — PHYSICAL EXAM
[Chaperone Present] : A chaperone was present in the examining room during all aspects of the physical examination [FreeTextEntry1] : NATLAYA Zhang  [Appropriately responsive] : appropriately responsive [Alert] : alert [No Acute Distress] : no acute distress [Soft] : soft [Non-tender] : non-tender [Non-distended] : non-distended [Oriented x3] : oriented x3 [FreeTextEntry6] : unremarkable  [Examination Of The Breasts] : a normal appearance [No Masses] : no breast masses were palpable [Labia Minora] : normal [Labia Majora] : normal [Normal] : normal [Uterine Adnexae] : normal [FreeTextEntry4] : mild white discharge

## 2023-08-31 ENCOUNTER — APPOINTMENT (OUTPATIENT)
Dept: FAMILY MEDICINE | Facility: CLINIC | Age: 33
End: 2023-08-31

## 2023-12-16 ENCOUNTER — NON-APPOINTMENT (OUTPATIENT)
Age: 33
End: 2023-12-16

## 2024-05-17 ENCOUNTER — NON-APPOINTMENT (OUTPATIENT)
Age: 34
End: 2024-05-17

## 2024-05-25 ENCOUNTER — NON-APPOINTMENT (OUTPATIENT)
Age: 34
End: 2024-05-25

## 2024-06-18 ENCOUNTER — LABORATORY RESULT (OUTPATIENT)
Age: 34
End: 2024-06-18

## 2024-06-18 ENCOUNTER — APPOINTMENT (OUTPATIENT)
Dept: OBGYN | Facility: CLINIC | Age: 34
End: 2024-06-18
Payer: MEDICAID

## 2024-06-18 ENCOUNTER — NON-APPOINTMENT (OUTPATIENT)
Age: 34
End: 2024-06-18

## 2024-06-18 VITALS
SYSTOLIC BLOOD PRESSURE: 114 MMHG | HEIGHT: 65 IN | BODY MASS INDEX: 35.12 KG/M2 | WEIGHT: 210.8 LBS | DIASTOLIC BLOOD PRESSURE: 68 MMHG

## 2024-06-18 DIAGNOSIS — Z01.419 ENCOUNTER FOR GYNECOLOGICAL EXAMINATION (GENERAL) (ROUTINE) W/OUT ABNORMAL FINDINGS: ICD-10-CM

## 2024-06-18 PROCEDURE — 99395 PREV VISIT EST AGE 18-39: CPT | Mod: 25

## 2024-06-18 PROCEDURE — 99459 PELVIC EXAMINATION: CPT

## 2024-06-18 NOTE — DISCUSSION/SUMMARY
[FreeTextEntry1] : Davina is a 32 y/o  LMP 24 who presents for annual exam.   #Well Woman Visit 1. Nutrition/Activity: The benefits of balanced diet and physical activity discussed with patient. 2. Health Screening: She was informed of the benefits of a screening colonoscopy/DEXA/Mammo/Pap. - Cervix: We reviewed ASCCP/ACOG guidelines for pap smear screening. Last PAP Sep 2021, NILM, collected today.  3. Sex Health:  The importance of safe-sex practices was discussed with the patient. STD screening was offered to patient, she is agreeable, collected.  4. Contraception: does not desire contraception, counseled about safe sex practices and barrier protection use.  She verbalized understanding and agreement with above counseling regarding differential diagnoses, evaluations, and plans.  She was given time for questions/concerns which were all answered to her apparent satisfaction.   RTO 1yr for next GYN ANNUAL.

## 2024-06-18 NOTE — HISTORY OF PRESENT ILLNESS
[Gonorrhea test offered] : Gonorrhea test offered [Chlamydia test offered] : Chlamydia test offered [HPV test offered] : HPV test offered [Y] : Positive pregnancy history [Menarche Age: ____] : age at menarche was [unfilled] [No] : Patient does not have concerns regarding sex [N] : Patient is not sexually active [Previously active] : previously active [PapSmeardate] : 9/21/21 [TextBox_31] : neg [GonorrheaDate] : 3/7/23 [TextBox_63] : neg [ChlamydiaDate] : 3/7/23 [HPVDate] : 9/21/21 [TextBox_68] : neg [TextBox_78] : neg [LMPDate] : 6/11/24 [PGHxTotal] : 2 [[facility] : 2 [[facility]] : 2 [FreeTextEntry1] : 6/11/24

## 2024-06-18 NOTE — PHYSICAL EXAM
[Chaperone Present] : A chaperone was present in the examining room during all aspects of the physical examination [06122] : A chaperone was present during the pelvic exam. [FreeTextEntry2] : NATALYA Albrecht [Appropriately responsive] : appropriately responsive [Alert] : alert [No Acute Distress] : no acute distress [Soft] : soft [Non-tender] : non-tender [Non-distended] : non-distended [Oriented x3] : oriented x3 [Labia Majora] : normal [Labia Minora] : normal [Normal] : normal [Uterine Adnexae] : normal

## 2024-06-19 LAB
C TRACH RRNA SPEC QL NAA+PROBE: NOT DETECTED
HAV IGM SER QL: NONREACTIVE
HBV CORE IGM SER QL: NONREACTIVE
HBV SURFACE AG SER QL: NONREACTIVE
HCV AB SER QL: NONREACTIVE
HCV S/CO RATIO: 0.14 S/CO
HIV1+2 AB SPEC QL IA.RAPID: NONREACTIVE
HPV HIGH+LOW RISK DNA PNL CVX: NOT DETECTED
N GONORRHOEA RRNA SPEC QL NAA+PROBE: NOT DETECTED
SOURCE TP AMPLIFICATION: NORMAL

## 2024-06-20 LAB — T PALLIDUM AB SER QL IA: POSITIVE

## 2024-06-21 LAB
HSV 1 IGG TYPE-SPECIFIC AB: <0.9 INDEX
HSV 2 IGG TYPE-SPECIFIC AB: <0.9 INDEX

## 2024-06-22 LAB — CYTOLOGY CVX/VAG DOC THIN PREP: NORMAL

## 2024-09-03 ENCOUNTER — NON-APPOINTMENT (OUTPATIENT)
Age: 34
End: 2024-09-03

## 2024-09-17 ENCOUNTER — APPOINTMENT (OUTPATIENT)
Dept: OBGYN | Facility: CLINIC | Age: 34
End: 2024-09-17

## 2025-06-23 ENCOUNTER — LABORATORY RESULT (OUTPATIENT)
Age: 35
End: 2025-06-23

## 2025-06-23 ENCOUNTER — APPOINTMENT (OUTPATIENT)
Dept: OBGYN | Facility: CLINIC | Age: 35
End: 2025-06-23
Payer: MEDICAID

## 2025-06-23 VITALS
WEIGHT: 235 LBS | BODY MASS INDEX: 39.15 KG/M2 | DIASTOLIC BLOOD PRESSURE: 72 MMHG | HEIGHT: 65 IN | SYSTOLIC BLOOD PRESSURE: 118 MMHG

## 2025-06-23 PROCEDURE — 99395 PREV VISIT EST AGE 18-39: CPT | Mod: 25

## 2025-06-23 PROCEDURE — 99459 PELVIC EXAMINATION: CPT

## 2025-06-24 LAB
C TRACH RRNA SPEC QL NAA+PROBE: NOT DETECTED
HAV IGM SER QL: NONREACTIVE
HBV CORE IGM SER QL: NONREACTIVE
HBV SURFACE AG SER QL: NONREACTIVE
HCV AB SER QL: NONREACTIVE
HCV S/CO RATIO: 0.22 S/CO
HIV1+2 AB SPEC QL IA.RAPID: NONREACTIVE
HPV HIGH+LOW RISK DNA PNL CVX: NOT DETECTED
N GONORRHOEA RRNA SPEC QL NAA+PROBE: NOT DETECTED
SOURCE TP AMPLIFICATION: NORMAL
T PALLIDUM AB SER QL IA: POSITIVE

## 2025-06-25 LAB — CYTOLOGY CVX/VAG DOC THIN PREP: NORMAL
